# Patient Record
Sex: FEMALE | Race: BLACK OR AFRICAN AMERICAN | NOT HISPANIC OR LATINO
[De-identification: names, ages, dates, MRNs, and addresses within clinical notes are randomized per-mention and may not be internally consistent; named-entity substitution may affect disease eponyms.]

---

## 2019-11-19 ENCOUNTER — APPOINTMENT (OUTPATIENT)
Dept: OBGYN | Facility: CLINIC | Age: 34
End: 2019-11-19

## 2019-11-19 VITALS — BODY MASS INDEX: 44.41 KG/M2 | HEIGHT: 68 IN | WEIGHT: 293 LBS

## 2019-11-19 DIAGNOSIS — Z86.19 PERSONAL HISTORY OF OTHER INFECTIOUS AND PARASITIC DISEASES: ICD-10-CM

## 2019-11-19 DIAGNOSIS — M77.9 ENTHESOPATHY, UNSPECIFIED: ICD-10-CM

## 2019-11-19 DIAGNOSIS — Z78.9 OTHER SPECIFIED HEALTH STATUS: ICD-10-CM

## 2019-11-19 PROBLEM — Z00.00 ENCOUNTER FOR PREVENTIVE HEALTH EXAMINATION: Status: ACTIVE | Noted: 2019-11-19

## 2019-11-20 LAB
ABO + RH PNL BLD: NORMAL
ALBUMIN SERPL ELPH-MCNC: 4.3 G/DL
ALP BLD-CCNC: 44 U/L
ALT SERPL-CCNC: 30 U/L
ANION GAP SERPL CALC-SCNC: 16 MMOL/L
AST SERPL-CCNC: 23 U/L
BASOPHILS # BLD AUTO: 0.03 K/UL
BASOPHILS NFR BLD AUTO: 0.5 %
BILIRUB SERPL-MCNC: 0.2 MG/DL
BLD GP AB SCN SERPL QL: NORMAL
BUN SERPL-MCNC: 8 MG/DL
CALCIUM SERPL-MCNC: 9.3 MG/DL
CHLORIDE SERPL-SCNC: 103 MMOL/L
CO2 SERPL-SCNC: 21 MMOL/L
CREAT SERPL-MCNC: 0.83 MG/DL
EOSINOPHIL # BLD AUTO: 0.05 K/UL
EOSINOPHIL NFR BLD AUTO: 0.8 %
GLUCOSE SERPL-MCNC: 34 MG/DL
HCT VFR BLD CALC: 39.1 %
HGB BLD-MCNC: 12.5 G/DL
HIV1+2 AB SPEC QL IA.RAPID: NONREACTIVE
IMM GRANULOCYTES NFR BLD AUTO: 0.3 %
LYMPHOCYTES # BLD AUTO: 1.82 K/UL
LYMPHOCYTES NFR BLD AUTO: 27.5 %
MAN DIFF?: NORMAL
MCHC RBC-ENTMCNC: 28.4 PG
MCHC RBC-ENTMCNC: 32 GM/DL
MCV RBC AUTO: 88.9 FL
MONOCYTES # BLD AUTO: 0.56 K/UL
MONOCYTES NFR BLD AUTO: 8.5 %
NEUTROPHILS # BLD AUTO: 4.13 K/UL
NEUTROPHILS NFR BLD AUTO: 62.4 %
PLATELET # BLD AUTO: 203 K/UL
POTASSIUM SERPL-SCNC: 4 MMOL/L
PROT SERPL-MCNC: 7.1 G/DL
RBC # BLD: 4.4 M/UL
RBC # FLD: 13.9 %
SODIUM SERPL-SCNC: 140 MMOL/L
TSH SERPL-ACNC: 2.47 UIU/ML
WBC # FLD AUTO: 6.61 K/UL

## 2019-11-22 LAB
B19V IGG SER QL IA: 0.4 INDEX
B19V IGG+IGM SER-IMP: NEGATIVE
B19V IGG+IGM SER-IMP: NORMAL
B19V IGM FLD-ACNC: 0.1
B19V IGM SER-ACNC: NEGATIVE
C TRACH RRNA SPEC QL NAA+PROBE: NOT DETECTED
CANDIDA VAG CYTO: NOT DETECTED
CMV IGG SERPL QL: 8.7 U/ML
CMV IGG SERPL-IMP: POSITIVE
CMV IGM SERPL QL: <8 AU/ML
CMV IGM SERPL QL: NEGATIVE
CYTOMEGALOVIRUS ABS IGM: <30 AU/ML
G VAGINALIS+PREV SP MTYP VAG QL MICRO: DETECTED
HBV CORE IGG+IGM SER QL: REACTIVE
HBV SURFACE AB SERPL IA-ACNC: <3 MIU/ML
HBV SURFACE AG SER QL: REACTIVE
HCV AB SER QL: NONREACTIVE
HCV RNA FLD QL NAA+PROBE: NORMAL
HCV RNA SPEC QL PROBE+SIG AMP: NOT DETECTED
HCV S/CO RATIO: 0.11 S/CO
HERPES SIMPLEX 1 AND 2 ABS IGM: <0.91 RATIO
HGB A MFR BLD: 55.9 %
HGB A2 MFR BLD: 3.4 %
HGB FRACT BLD-IMP: NORMAL
HGB S BLD QL SOLY: POSITIVE
HGB S MFR BLD: 40.7 %
MEV IGG FLD QL IA: >300 AU/ML
MEV IGG+IGM SER-IMP: POSITIVE
MEV IGM SER QL: NEGATIVE
MUV AB SER-ACNC: POSITIVE
MUV IGG SER QL IA: >300 AU/ML
MUV IGM SER QL IA: 1.69 AU
N GONORRHOEA RRNA SPEC QL NAA+PROBE: NOT DETECTED
RUBV IGG FLD-ACNC: 27.8 INDEX
RUBV IGG SER-IMP: POSITIVE
RUBV IGM FLD-ACNC: <20 AU/ML
SOURCE AMPLIFICATION: NORMAL
T GONDII AB SER-IMP: NEGATIVE
T GONDII AB SER-IMP: POSITIVE
T GONDII IGG SER QL: 104 IU/ML
T GONDII IGM SER QL: <3 AU/ML
T PALLIDUM AB SER QL IA: NEGATIVE
T VAGINALIS VAG QL WET PREP: NOT DETECTED
TOXOPLASMA GONDII ABS IGM: <3 AU/ML
VZV AB TITR SER: POSITIVE
VZV IGG SER IF-ACNC: 2952 INDEX
VZV IGM SER IF-ACNC: <0.91 INDEX

## 2019-11-26 ENCOUNTER — APPOINTMENT (OUTPATIENT)
Dept: ANTEPARTUM | Facility: CLINIC | Age: 34
End: 2019-11-26
Payer: COMMERCIAL

## 2019-11-26 PROCEDURE — 76801 OB US < 14 WKS SINGLE FETUS: CPT

## 2019-12-04 RX ORDER — CHLORHEXIDINE GLUCONATE 4 %
400 LIQUID (ML) TOPICAL DAILY
Qty: 30 | Refills: 11 | Status: ACTIVE | COMMUNITY
Start: 2019-12-04 | End: 1900-01-01

## 2019-12-05 ENCOUNTER — APPOINTMENT (OUTPATIENT)
Dept: ANTEPARTUM | Facility: CLINIC | Age: 34
End: 2019-12-05

## 2019-12-05 ENCOUNTER — APPOINTMENT (OUTPATIENT)
Dept: MATERNAL FETAL MEDICINE | Facility: CLINIC | Age: 34
End: 2019-12-05
Payer: COMMERCIAL

## 2019-12-17 ENCOUNTER — APPOINTMENT (OUTPATIENT)
Dept: MATERNAL FETAL MEDICINE | Facility: CLINIC | Age: 34
End: 2019-12-17
Payer: COMMERCIAL

## 2019-12-17 ENCOUNTER — APPOINTMENT (OUTPATIENT)
Dept: ANTEPARTUM | Facility: CLINIC | Age: 34
End: 2019-12-17

## 2019-12-17 PROCEDURE — 76801 OB US < 14 WKS SINGLE FETUS: CPT

## 2019-12-17 PROCEDURE — 99205 OFFICE O/P NEW HI 60 MIN: CPT

## 2019-12-17 PROCEDURE — 76813 OB US NUCHAL MEAS 1 GEST: CPT

## 2019-12-18 ENCOUNTER — NON-APPOINTMENT (OUTPATIENT)
Age: 34
End: 2019-12-18

## 2019-12-18 ENCOUNTER — APPOINTMENT (OUTPATIENT)
Dept: OBGYN | Facility: CLINIC | Age: 34
End: 2019-12-18
Payer: COMMERCIAL

## 2019-12-18 VITALS
HEIGHT: 68 IN | SYSTOLIC BLOOD PRESSURE: 115 MMHG | WEIGHT: 293 LBS | DIASTOLIC BLOOD PRESSURE: 70 MMHG | BODY MASS INDEX: 44.41 KG/M2

## 2019-12-18 PROCEDURE — 0502F SUBSEQUENT PRENATAL CARE: CPT

## 2019-12-18 PROCEDURE — 36415 COLL VENOUS BLD VENIPUNCTURE: CPT

## 2019-12-23 LAB
MUV AB SER-ACNC: POSITIVE
MUV IGG SER QL IA: >300 AU/ML
MUV IGM SER QL IA: 1.01 AU

## 2020-01-15 ENCOUNTER — APPOINTMENT (OUTPATIENT)
Dept: OBGYN | Facility: CLINIC | Age: 35
End: 2020-01-15
Payer: COMMERCIAL

## 2020-01-15 ENCOUNTER — LABORATORY RESULT (OUTPATIENT)
Age: 35
End: 2020-01-15

## 2020-01-15 VITALS
DIASTOLIC BLOOD PRESSURE: 80 MMHG | BODY MASS INDEX: 44.41 KG/M2 | HEIGHT: 68 IN | SYSTOLIC BLOOD PRESSURE: 125 MMHG | WEIGHT: 293 LBS

## 2020-01-15 DIAGNOSIS — Z34.90 ENCOUNTER FOR SUPERVISION OF NORMAL PREGNANCY, UNSPECIFIED, UNSPECIFIED TRIMESTER: ICD-10-CM

## 2020-01-15 PROCEDURE — 0502F SUBSEQUENT PRENATAL CARE: CPT

## 2020-01-15 PROCEDURE — 36415 COLL VENOUS BLD VENIPUNCTURE: CPT

## 2020-01-15 RX ORDER — DICLOFENAC SODIUM 3 G/100G
3 GEL TOPICAL
Qty: 100 | Refills: 0 | Status: ACTIVE | COMMUNITY
Start: 2020-01-09

## 2020-01-28 ENCOUNTER — APPOINTMENT (OUTPATIENT)
Dept: ANTEPARTUM | Facility: CLINIC | Age: 35
End: 2020-01-28
Payer: COMMERCIAL

## 2020-01-28 PROCEDURE — 76811 OB US DETAILED SNGL FETUS: CPT

## 2020-01-28 PROCEDURE — 76817 TRANSVAGINAL US OBSTETRIC: CPT

## 2020-01-29 ENCOUNTER — APPOINTMENT (OUTPATIENT)
Dept: ANTEPARTUM | Facility: CLINIC | Age: 35
End: 2020-01-29

## 2020-01-29 LAB
ESTIMATED AVERAGE GLUCOSE: 94 MG/DL
HBA1C MFR BLD HPLC: 4.9 %

## 2020-02-04 ENCOUNTER — APPOINTMENT (OUTPATIENT)
Dept: ANTEPARTUM | Facility: CLINIC | Age: 35
End: 2020-02-04

## 2020-02-07 ENCOUNTER — APPOINTMENT (OUTPATIENT)
Dept: ANTEPARTUM | Facility: CLINIC | Age: 35
End: 2020-02-07
Payer: COMMERCIAL

## 2020-02-07 PROCEDURE — 76816 OB US FOLLOW-UP PER FETUS: CPT

## 2020-02-12 ENCOUNTER — APPOINTMENT (OUTPATIENT)
Dept: OBGYN | Facility: CLINIC | Age: 35
End: 2020-02-12
Payer: COMMERCIAL

## 2020-02-12 VITALS
HEIGHT: 68 IN | SYSTOLIC BLOOD PRESSURE: 118 MMHG | BODY MASS INDEX: 44.41 KG/M2 | DIASTOLIC BLOOD PRESSURE: 70 MMHG | WEIGHT: 293 LBS

## 2020-02-12 PROCEDURE — 0502F SUBSEQUENT PRENATAL CARE: CPT

## 2020-02-21 ENCOUNTER — APPOINTMENT (OUTPATIENT)
Dept: ANTEPARTUM | Facility: CLINIC | Age: 35
End: 2020-02-21
Payer: COMMERCIAL

## 2020-02-21 PROCEDURE — 76817 TRANSVAGINAL US OBSTETRIC: CPT

## 2020-02-21 PROCEDURE — 76816 OB US FOLLOW-UP PER FETUS: CPT

## 2020-03-11 ENCOUNTER — NON-APPOINTMENT (OUTPATIENT)
Age: 35
End: 2020-03-11

## 2020-03-11 ENCOUNTER — APPOINTMENT (OUTPATIENT)
Dept: OBGYN | Facility: CLINIC | Age: 35
End: 2020-03-11
Payer: COMMERCIAL

## 2020-03-11 VITALS
BODY MASS INDEX: 44.41 KG/M2 | DIASTOLIC BLOOD PRESSURE: 70 MMHG | WEIGHT: 293 LBS | HEIGHT: 68 IN | SYSTOLIC BLOOD PRESSURE: 120 MMHG

## 2020-03-11 PROCEDURE — 36415 COLL VENOUS BLD VENIPUNCTURE: CPT

## 2020-03-11 PROCEDURE — 0502F SUBSEQUENT PRENATAL CARE: CPT

## 2020-03-17 ENCOUNTER — APPOINTMENT (OUTPATIENT)
Dept: ANTEPARTUM | Facility: CLINIC | Age: 35
End: 2020-03-17
Payer: COMMERCIAL

## 2020-03-17 PROCEDURE — 76816 OB US FOLLOW-UP PER FETUS: CPT

## 2020-03-17 PROCEDURE — 76819 FETAL BIOPHYS PROFIL W/O NST: CPT

## 2020-04-03 ENCOUNTER — APPOINTMENT (OUTPATIENT)
Dept: OBGYN | Facility: CLINIC | Age: 35
End: 2020-04-03
Payer: COMMERCIAL

## 2020-04-03 ENCOUNTER — NON-APPOINTMENT (OUTPATIENT)
Age: 35
End: 2020-04-03

## 2020-04-03 VITALS
BODY MASS INDEX: 44.41 KG/M2 | DIASTOLIC BLOOD PRESSURE: 80 MMHG | HEIGHT: 68 IN | SYSTOLIC BLOOD PRESSURE: 120 MMHG | WEIGHT: 293 LBS

## 2020-04-03 PROCEDURE — 0502F SUBSEQUENT PRENATAL CARE: CPT

## 2020-04-03 PROCEDURE — 36415 COLL VENOUS BLD VENIPUNCTURE: CPT

## 2020-04-07 LAB
BACTERIA UR CULT: NORMAL
BASOPHILS # BLD AUTO: 0.03 K/UL
BASOPHILS NFR BLD AUTO: 0.3 %
EOSINOPHIL # BLD AUTO: 0.04 K/UL
EOSINOPHIL NFR BLD AUTO: 0.4 %
ESTIMATED AVERAGE GLUCOSE: 91 MG/DL
GLUCOSE 1H P 50 G GLC PO SERPL-MCNC: 81 MG/DL
HBA1C MFR BLD HPLC: 4.8 %
HCT VFR BLD CALC: 32.4 %
HGB BLD-MCNC: 10.3 G/DL
IMM GRANULOCYTES NFR BLD AUTO: 1.3 %
LYMPHOCYTES # BLD AUTO: 1.43 K/UL
LYMPHOCYTES NFR BLD AUTO: 15 %
MAN DIFF?: NORMAL
MCHC RBC-ENTMCNC: 27.6 PG
MCHC RBC-ENTMCNC: 31.8 GM/DL
MCV RBC AUTO: 86.9 FL
MONOCYTES # BLD AUTO: 0.63 K/UL
MONOCYTES NFR BLD AUTO: 6.6 %
NEUTROPHILS # BLD AUTO: 7.29 K/UL
NEUTROPHILS NFR BLD AUTO: 76.4 %
PLATELET # BLD AUTO: 147 K/UL
RBC # BLD: 3.73 M/UL
RBC # FLD: 14.6 %
WBC # FLD AUTO: 9.54 K/UL

## 2020-04-28 ENCOUNTER — NON-APPOINTMENT (OUTPATIENT)
Age: 35
End: 2020-04-28

## 2020-04-28 ENCOUNTER — APPOINTMENT (OUTPATIENT)
Dept: OBGYN | Facility: CLINIC | Age: 35
End: 2020-04-28
Payer: COMMERCIAL

## 2020-04-28 ENCOUNTER — APPOINTMENT (OUTPATIENT)
Dept: ANTEPARTUM | Facility: CLINIC | Age: 35
End: 2020-04-28
Payer: COMMERCIAL

## 2020-04-28 VITALS
SYSTOLIC BLOOD PRESSURE: 121 MMHG | HEIGHT: 68 IN | BODY MASS INDEX: 44.41 KG/M2 | DIASTOLIC BLOOD PRESSURE: 75 MMHG | WEIGHT: 293 LBS

## 2020-04-28 PROCEDURE — 76819 FETAL BIOPHYS PROFIL W/O NST: CPT

## 2020-04-28 PROCEDURE — 0502F SUBSEQUENT PRENATAL CARE: CPT

## 2020-04-28 PROCEDURE — 76816 OB US FOLLOW-UP PER FETUS: CPT

## 2020-05-19 ENCOUNTER — APPOINTMENT (OUTPATIENT)
Dept: ANTEPARTUM | Facility: CLINIC | Age: 35
End: 2020-05-19
Payer: COMMERCIAL

## 2020-05-19 ENCOUNTER — APPOINTMENT (OUTPATIENT)
Dept: OBGYN | Facility: CLINIC | Age: 35
End: 2020-05-19
Payer: COMMERCIAL

## 2020-05-19 VITALS
BODY MASS INDEX: 44.41 KG/M2 | WEIGHT: 293 LBS | HEIGHT: 68 IN | DIASTOLIC BLOOD PRESSURE: 70 MMHG | SYSTOLIC BLOOD PRESSURE: 121 MMHG

## 2020-05-19 PROCEDURE — 0502F SUBSEQUENT PRENATAL CARE: CPT

## 2020-05-19 PROCEDURE — 76819 FETAL BIOPHYS PROFIL W/O NST: CPT

## 2020-05-19 PROCEDURE — 76816 OB US FOLLOW-UP PER FETUS: CPT

## 2020-05-28 RX ORDER — NEEDLES, DISPOSABLE 25GX5/8"
27G X 1/2" NEEDLE, DISPOSABLE MISCELLANEOUS
Qty: 1 | Refills: 4 | Status: ACTIVE | COMMUNITY
Start: 2020-05-28 | End: 1900-01-01

## 2020-06-02 ENCOUNTER — APPOINTMENT (OUTPATIENT)
Dept: OBGYN | Facility: CLINIC | Age: 35
End: 2020-06-02
Payer: COMMERCIAL

## 2020-06-02 ENCOUNTER — NON-APPOINTMENT (OUTPATIENT)
Age: 35
End: 2020-06-02

## 2020-06-02 ENCOUNTER — APPOINTMENT (OUTPATIENT)
Dept: ANTEPARTUM | Facility: CLINIC | Age: 35
End: 2020-06-02
Payer: COMMERCIAL

## 2020-06-02 VITALS
SYSTOLIC BLOOD PRESSURE: 117 MMHG | HEIGHT: 68 IN | DIASTOLIC BLOOD PRESSURE: 70 MMHG | WEIGHT: 293 LBS | BODY MASS INDEX: 44.41 KG/M2

## 2020-06-02 PROCEDURE — 0502F SUBSEQUENT PRENATAL CARE: CPT

## 2020-06-02 PROCEDURE — 76819 FETAL BIOPHYS PROFIL W/O NST: CPT

## 2020-06-02 PROCEDURE — 76816 OB US FOLLOW-UP PER FETUS: CPT

## 2020-06-09 ENCOUNTER — APPOINTMENT (OUTPATIENT)
Dept: ANTEPARTUM | Facility: CLINIC | Age: 35
End: 2020-06-09
Payer: COMMERCIAL

## 2020-06-09 ENCOUNTER — APPOINTMENT (OUTPATIENT)
Dept: OBGYN | Facility: CLINIC | Age: 35
End: 2020-06-09
Payer: COMMERCIAL

## 2020-06-09 VITALS
SYSTOLIC BLOOD PRESSURE: 118 MMHG | HEIGHT: 68 IN | BODY MASS INDEX: 44.41 KG/M2 | DIASTOLIC BLOOD PRESSURE: 72 MMHG | WEIGHT: 293 LBS

## 2020-06-09 PROCEDURE — 0502F SUBSEQUENT PRENATAL CARE: CPT

## 2020-06-09 PROCEDURE — 76819 FETAL BIOPHYS PROFIL W/O NST: CPT

## 2020-06-09 PROCEDURE — 76816 OB US FOLLOW-UP PER FETUS: CPT

## 2020-06-16 ENCOUNTER — APPOINTMENT (OUTPATIENT)
Dept: OBGYN | Facility: CLINIC | Age: 35
End: 2020-06-16
Payer: COMMERCIAL

## 2020-06-16 ENCOUNTER — APPOINTMENT (OUTPATIENT)
Dept: ANTEPARTUM | Facility: CLINIC | Age: 35
End: 2020-06-16
Payer: COMMERCIAL

## 2020-06-16 VITALS
HEIGHT: 68 IN | DIASTOLIC BLOOD PRESSURE: 75 MMHG | BODY MASS INDEX: 44.41 KG/M2 | WEIGHT: 293 LBS | SYSTOLIC BLOOD PRESSURE: 121 MMHG

## 2020-06-16 PROCEDURE — 76816 OB US FOLLOW-UP PER FETUS: CPT

## 2020-06-16 PROCEDURE — 0502F SUBSEQUENT PRENATAL CARE: CPT

## 2020-06-16 PROCEDURE — 76818 FETAL BIOPHYS PROFILE W/NST: CPT

## 2020-06-20 ENCOUNTER — TRANSCRIPTION ENCOUNTER (OUTPATIENT)
Age: 35
End: 2020-06-20

## 2020-06-20 ENCOUNTER — INPATIENT (INPATIENT)
Facility: HOSPITAL | Age: 35
LOS: 4 days | Discharge: HOME | End: 2020-06-25
Attending: OBSTETRICS & GYNECOLOGY | Admitting: OBSTETRICS & GYNECOLOGY
Payer: COMMERCIAL

## 2020-06-20 VITALS — HEIGHT: 68 IN | WEIGHT: 293 LBS

## 2020-06-20 DIAGNOSIS — Z3A.00 WEEKS OF GESTATION OF PREGNANCY NOT SPECIFIED: ICD-10-CM

## 2020-06-20 DIAGNOSIS — O26.899 OTHER SPECIFIED PREGNANCY RELATED CONDITIONS, UNSPECIFIED TRIMESTER: ICD-10-CM

## 2020-06-20 LAB
ALBUMIN SERPL ELPH-MCNC: 3.3 G/DL — SIGNIFICANT CHANGE UP (ref 3.3–5)
ALP SERPL-CCNC: 286 U/L — HIGH (ref 40–120)
ALT FLD-CCNC: 29 U/L — SIGNIFICANT CHANGE UP (ref 10–45)
AMNISURE ROM (RUPTURE OF MEMBRANES): POSITIVE
ANION GAP SERPL CALC-SCNC: 10 MMOL/L — SIGNIFICANT CHANGE UP (ref 5–17)
APPEARANCE UR: CLEAR — SIGNIFICANT CHANGE UP
APTT BLD: 26.8 SEC — LOW (ref 27.5–36.3)
AST SERPL-CCNC: 76 U/L — HIGH (ref 10–40)
BASOPHILS # BLD AUTO: 0.01 K/UL — SIGNIFICANT CHANGE UP (ref 0–0.2)
BASOPHILS NFR BLD AUTO: 0.2 % — SIGNIFICANT CHANGE UP (ref 0–2)
BILIRUB SERPL-MCNC: 0.3 MG/DL — SIGNIFICANT CHANGE UP (ref 0.2–1.2)
BILIRUB UR-MCNC: NEGATIVE — SIGNIFICANT CHANGE UP
BUN SERPL-MCNC: 8 MG/DL — SIGNIFICANT CHANGE UP (ref 7–23)
CALCIUM SERPL-MCNC: 8.9 MG/DL — SIGNIFICANT CHANGE UP (ref 8.4–10.5)
CHLORIDE SERPL-SCNC: 108 MMOL/L — SIGNIFICANT CHANGE UP (ref 96–108)
CO2 SERPL-SCNC: 20 MMOL/L — LOW (ref 22–31)
COLOR SPEC: YELLOW — SIGNIFICANT CHANGE UP
CREAT ?TM UR-MCNC: 129 MG/DL — SIGNIFICANT CHANGE UP
CREAT SERPL-MCNC: 0.96 MG/DL — SIGNIFICANT CHANGE UP (ref 0.5–1.3)
DIFF PNL FLD: ABNORMAL
EOSINOPHIL # BLD AUTO: 0.05 K/UL — SIGNIFICANT CHANGE UP (ref 0–0.5)
EOSINOPHIL NFR BLD AUTO: 0.8 % — SIGNIFICANT CHANGE UP (ref 0–6)
FIBRINOGEN PPP-MCNC: 368 MG/DL — SIGNIFICANT CHANGE UP (ref 258–438)
GLUCOSE SERPL-MCNC: 100 MG/DL — HIGH (ref 70–99)
GLUCOSE UR QL: NEGATIVE — SIGNIFICANT CHANGE UP
GP B STREP DNA SPEC QL NAA+PROBE: DETECTED
GP B STREP DNA SPEC QL NAA+PROBE: NORMAL
HCT VFR BLD CALC: 31.3 % — LOW (ref 34.5–45)
HGB BLD-MCNC: 10.4 G/DL — LOW (ref 11.5–15.5)
IMM GRANULOCYTES NFR BLD AUTO: 0.8 % — SIGNIFICANT CHANGE UP (ref 0–1.5)
INR BLD: 0.96 — SIGNIFICANT CHANGE UP (ref 0.88–1.16)
KETONES UR-MCNC: NEGATIVE — SIGNIFICANT CHANGE UP
LDH SERPL L TO P-CCNC: 242 U/L — SIGNIFICANT CHANGE UP (ref 50–242)
LEUKOCYTE ESTERASE UR-ACNC: NEGATIVE — SIGNIFICANT CHANGE UP
LYMPHOCYTES # BLD AUTO: 1.52 K/UL — SIGNIFICANT CHANGE UP (ref 1–3.3)
LYMPHOCYTES # BLD AUTO: 23.5 % — SIGNIFICANT CHANGE UP (ref 13–44)
MCHC RBC-ENTMCNC: 25.9 PG — LOW (ref 27–34)
MCHC RBC-ENTMCNC: 33.2 GM/DL — SIGNIFICANT CHANGE UP (ref 32–36)
MCV RBC AUTO: 77.9 FL — LOW (ref 80–100)
MONOCYTES # BLD AUTO: 0.41 K/UL — SIGNIFICANT CHANGE UP (ref 0–0.9)
MONOCYTES NFR BLD AUTO: 6.3 % — SIGNIFICANT CHANGE UP (ref 2–14)
NEUTROPHILS # BLD AUTO: 4.43 K/UL — SIGNIFICANT CHANGE UP (ref 1.8–7.4)
NEUTROPHILS NFR BLD AUTO: 68.4 % — SIGNIFICANT CHANGE UP (ref 43–77)
NITRITE UR-MCNC: NEGATIVE — SIGNIFICANT CHANGE UP
NRBC # BLD: 0 /100 WBCS — SIGNIFICANT CHANGE UP (ref 0–0)
PH UR: 6 — SIGNIFICANT CHANGE UP (ref 5–8)
PLATELET # BLD AUTO: 157 K/UL — SIGNIFICANT CHANGE UP (ref 150–400)
POTASSIUM SERPL-MCNC: 3.8 MMOL/L — SIGNIFICANT CHANGE UP (ref 3.5–5.3)
POTASSIUM SERPL-SCNC: 3.8 MMOL/L — SIGNIFICANT CHANGE UP (ref 3.5–5.3)
PROT ?TM UR-MCNC: 11 MG/DL — SIGNIFICANT CHANGE UP (ref 0–12)
PROT SERPL-MCNC: 6 G/DL — SIGNIFICANT CHANGE UP (ref 6–8.3)
PROT UR-MCNC: NEGATIVE MG/DL — SIGNIFICANT CHANGE UP
PROT/CREAT UR-RTO: 0.1 RATIO — SIGNIFICANT CHANGE UP (ref 0–0.2)
PROTHROM AB SERPL-ACNC: 10.9 SEC — SIGNIFICANT CHANGE UP (ref 10–12.9)
RBC # BLD: 4.02 M/UL — SIGNIFICANT CHANGE UP (ref 3.8–5.2)
RBC # FLD: 16.3 % — HIGH (ref 10.3–14.5)
SARS-COV-2 RNA SPEC QL NAA+PROBE: SIGNIFICANT CHANGE UP
SODIUM SERPL-SCNC: 138 MMOL/L — SIGNIFICANT CHANGE UP (ref 135–145)
SOURCE GBS: NORMAL
SP GR SPEC: 1.01 — SIGNIFICANT CHANGE UP (ref 1–1.03)
URATE SERPL-MCNC: 5.3 MG/DL — SIGNIFICANT CHANGE UP (ref 2.5–7)
UROBILINOGEN FLD QL: 0.2 E.U./DL — SIGNIFICANT CHANGE UP
WBC # BLD: 6.47 K/UL — SIGNIFICANT CHANGE UP (ref 3.8–10.5)
WBC # FLD AUTO: 6.47 K/UL — SIGNIFICANT CHANGE UP (ref 3.8–10.5)

## 2020-06-20 PROCEDURE — 59510 CESAREAN DELIVERY: CPT

## 2020-06-20 RX ORDER — AMPICILLIN TRIHYDRATE 250 MG
1 CAPSULE ORAL EVERY 4 HOURS
Refills: 0 | Status: DISCONTINUED | OUTPATIENT
Start: 2020-06-20 | End: 2020-06-21

## 2020-06-20 RX ORDER — OXYTOCIN 10 UNIT/ML
1 VIAL (ML) INJECTION
Qty: 30 | Refills: 0 | Status: DISCONTINUED | OUTPATIENT
Start: 2020-06-20 | End: 2020-06-21

## 2020-06-20 RX ORDER — OXYTOCIN 10 UNIT/ML
333.33 VIAL (ML) INJECTION
Qty: 20 | Refills: 0 | Status: DISCONTINUED | OUTPATIENT
Start: 2020-06-20 | End: 2020-06-21

## 2020-06-20 RX ORDER — SODIUM CHLORIDE 9 MG/ML
1000 INJECTION, SOLUTION INTRAVENOUS
Refills: 0 | Status: DISCONTINUED | OUTPATIENT
Start: 2020-06-20 | End: 2020-06-21

## 2020-06-20 RX ORDER — CITRIC ACID/SODIUM CITRATE 300-500 MG
15 SOLUTION, ORAL ORAL ONCE
Refills: 0 | Status: DISCONTINUED | OUTPATIENT
Start: 2020-06-20 | End: 2020-06-21

## 2020-06-20 RX ORDER — AMPICILLIN TRIHYDRATE 250 MG
2 CAPSULE ORAL ONCE
Refills: 0 | Status: COMPLETED | OUTPATIENT
Start: 2020-06-20 | End: 2020-06-20

## 2020-06-20 RX ORDER — AMPICILLIN TRIHYDRATE 250 MG
CAPSULE ORAL
Refills: 0 | Status: DISCONTINUED | OUTPATIENT
Start: 2020-06-20 | End: 2020-06-21

## 2020-06-20 RX ADMIN — Medication 216 GRAM(S): at 12:45

## 2020-06-20 RX ADMIN — Medication 208 GRAM(S): at 20:49

## 2020-06-20 RX ADMIN — SODIUM CHLORIDE 125 MILLILITER(S): 9 INJECTION, SOLUTION INTRAVENOUS at 11:51

## 2020-06-20 RX ADMIN — Medication 208 GRAM(S): at 16:40

## 2020-06-21 ENCOUNTER — RESULT REVIEW (OUTPATIENT)
Age: 35
End: 2020-06-21

## 2020-06-21 LAB
ALBUMIN SERPL ELPH-MCNC: 2.7 G/DL — LOW (ref 3.3–5)
ALP SERPL-CCNC: 252 U/L — HIGH (ref 40–120)
ALT FLD-CCNC: 25 U/L — SIGNIFICANT CHANGE UP (ref 10–45)
ANION GAP SERPL CALC-SCNC: 11 MMOL/L — SIGNIFICANT CHANGE UP (ref 5–17)
APTT BLD: 32.2 SEC — SIGNIFICANT CHANGE UP (ref 27.5–36.3)
AST SERPL-CCNC: 50 U/L — HIGH (ref 10–40)
BASOPHILS # BLD AUTO: 0 K/UL — SIGNIFICANT CHANGE UP (ref 0–0.2)
BASOPHILS NFR BLD AUTO: 0 % — SIGNIFICANT CHANGE UP (ref 0–2)
BILIRUB SERPL-MCNC: 1.1 MG/DL — SIGNIFICANT CHANGE UP (ref 0.2–1.2)
BUN SERPL-MCNC: 10 MG/DL — SIGNIFICANT CHANGE UP (ref 7–23)
CALCIUM SERPL-MCNC: 8.3 MG/DL — LOW (ref 8.4–10.5)
CHLORIDE SERPL-SCNC: 106 MMOL/L — SIGNIFICANT CHANGE UP (ref 96–108)
CO2 SERPL-SCNC: 20 MMOL/L — LOW (ref 22–31)
CREAT SERPL-MCNC: 1.42 MG/DL — HIGH (ref 0.5–1.3)
EOSINOPHIL # BLD AUTO: 0 K/UL — SIGNIFICANT CHANGE UP (ref 0–0.5)
EOSINOPHIL NFR BLD AUTO: 0 % — SIGNIFICANT CHANGE UP (ref 0–6)
FIBRINOGEN PPP-MCNC: 371 MG/DL — SIGNIFICANT CHANGE UP (ref 258–438)
GLUCOSE SERPL-MCNC: 111 MG/DL — HIGH (ref 70–99)
HCT VFR BLD CALC: 33.4 % — LOW (ref 34.5–45)
HGB BLD-MCNC: 10.9 G/DL — LOW (ref 11.5–15.5)
INR BLD: 1.05 — SIGNIFICANT CHANGE UP (ref 0.88–1.16)
LDH SERPL L TO P-CCNC: 276 U/L — HIGH (ref 50–242)
LYMPHOCYTES # BLD AUTO: 0.33 K/UL — LOW (ref 1–3.3)
LYMPHOCYTES # BLD AUTO: 1.7 % — LOW (ref 13–44)
MCHC RBC-ENTMCNC: 26.3 PG — LOW (ref 27–34)
MCHC RBC-ENTMCNC: 32.6 GM/DL — SIGNIFICANT CHANGE UP (ref 32–36)
MCV RBC AUTO: 80.5 FL — SIGNIFICANT CHANGE UP (ref 80–100)
MONOCYTES # BLD AUTO: 0.51 K/UL — SIGNIFICANT CHANGE UP (ref 0–0.9)
MONOCYTES NFR BLD AUTO: 2.6 % — SIGNIFICANT CHANGE UP (ref 2–14)
NEUTROPHILS # BLD AUTO: 18.48 K/UL — HIGH (ref 1.8–7.4)
NEUTROPHILS NFR BLD AUTO: 91.3 % — HIGH (ref 43–77)
PLATELET # BLD AUTO: 125 K/UL — LOW (ref 150–400)
POTASSIUM SERPL-MCNC: 4.7 MMOL/L — SIGNIFICANT CHANGE UP (ref 3.5–5.3)
POTASSIUM SERPL-SCNC: 4.7 MMOL/L — SIGNIFICANT CHANGE UP (ref 3.5–5.3)
PROT SERPL-MCNC: 5.4 G/DL — LOW (ref 6–8.3)
PROTHROM AB SERPL-ACNC: 12 SEC — SIGNIFICANT CHANGE UP (ref 10–12.9)
RBC # BLD: 4.15 M/UL — SIGNIFICANT CHANGE UP (ref 3.8–5.2)
RBC # FLD: 16.5 % — HIGH (ref 10.3–14.5)
SODIUM SERPL-SCNC: 137 MMOL/L — SIGNIFICANT CHANGE UP (ref 135–145)
T PALLIDUM AB TITR SER: NEGATIVE — SIGNIFICANT CHANGE UP
URATE SERPL-MCNC: 5 MG/DL — SIGNIFICANT CHANGE UP (ref 2.5–7)
WBC # BLD: 19.49 K/UL — HIGH (ref 3.8–10.5)
WBC # FLD AUTO: 19.49 K/UL — HIGH (ref 3.8–10.5)

## 2020-06-21 PROCEDURE — 88307 TISSUE EXAM BY PATHOLOGIST: CPT | Mod: 26

## 2020-06-21 RX ORDER — MAGNESIUM SULFATE 500 MG/ML
4 VIAL (ML) INJECTION ONCE
Refills: 0 | Status: COMPLETED | OUTPATIENT
Start: 2020-06-21 | End: 2020-06-21

## 2020-06-21 RX ORDER — OXYCODONE HYDROCHLORIDE 5 MG/1
5 TABLET ORAL ONCE
Refills: 0 | Status: DISCONTINUED | OUTPATIENT
Start: 2020-06-21 | End: 2020-06-25

## 2020-06-21 RX ORDER — ONDANSETRON 8 MG/1
4 TABLET, FILM COATED ORAL EVERY 6 HOURS
Refills: 0 | Status: DISCONTINUED | OUTPATIENT
Start: 2020-06-21 | End: 2020-06-25

## 2020-06-21 RX ORDER — ENOXAPARIN SODIUM 100 MG/ML
40 INJECTION SUBCUTANEOUS EVERY 12 HOURS
Refills: 0 | Status: DISCONTINUED | OUTPATIENT
Start: 2020-06-21 | End: 2020-06-25

## 2020-06-21 RX ORDER — GENTAMICIN SULFATE 40 MG/ML
320 VIAL (ML) INJECTION ONCE
Refills: 0 | Status: COMPLETED | OUTPATIENT
Start: 2020-06-21 | End: 2020-06-21

## 2020-06-21 RX ORDER — SODIUM CHLORIDE 9 MG/ML
1000 INJECTION, SOLUTION INTRAVENOUS
Refills: 0 | Status: DISCONTINUED | OUTPATIENT
Start: 2020-06-21 | End: 2020-06-21

## 2020-06-21 RX ORDER — AMPICILLIN TRIHYDRATE 250 MG
CAPSULE ORAL
Refills: 0 | Status: DISCONTINUED | OUTPATIENT
Start: 2020-06-21 | End: 2020-06-22

## 2020-06-21 RX ORDER — ACETAMINOPHEN 500 MG
1000 TABLET ORAL ONCE
Refills: 0 | Status: DISCONTINUED | OUTPATIENT
Start: 2020-06-21 | End: 2020-06-21

## 2020-06-21 RX ORDER — DIPHENHYDRAMINE HCL 50 MG
25 CAPSULE ORAL EVERY 6 HOURS
Refills: 0 | Status: DISCONTINUED | OUTPATIENT
Start: 2020-06-21 | End: 2020-06-25

## 2020-06-21 RX ORDER — TETANUS TOXOID, REDUCED DIPHTHERIA TOXOID AND ACELLULAR PERTUSSIS VACCINE, ADSORBED 5; 2.5; 8; 8; 2.5 [IU]/.5ML; [IU]/.5ML; UG/.5ML; UG/.5ML; UG/.5ML
0.5 SUSPENSION INTRAMUSCULAR ONCE
Refills: 0 | Status: DISCONTINUED | OUTPATIENT
Start: 2020-06-21 | End: 2020-06-25

## 2020-06-21 RX ORDER — OXYCODONE HYDROCHLORIDE 5 MG/1
5 TABLET ORAL
Refills: 0 | Status: DISCONTINUED | OUTPATIENT
Start: 2020-06-21 | End: 2020-06-25

## 2020-06-21 RX ORDER — IBUPROFEN 200 MG
600 TABLET ORAL EVERY 6 HOURS
Refills: 0 | Status: COMPLETED | OUTPATIENT
Start: 2020-06-21 | End: 2021-05-20

## 2020-06-21 RX ORDER — SIMETHICONE 80 MG/1
80 TABLET, CHEWABLE ORAL EVERY 4 HOURS
Refills: 0 | Status: DISCONTINUED | OUTPATIENT
Start: 2020-06-21 | End: 2020-06-25

## 2020-06-21 RX ORDER — FENTANYL/BUPIVACAINE/NS/PF 2MCG/ML-.1
250 PLASTIC BAG, INJECTION (ML) INJECTION
Refills: 0 | Status: DISCONTINUED | OUTPATIENT
Start: 2020-06-21 | End: 2020-06-21

## 2020-06-21 RX ORDER — CITRIC ACID/SODIUM CITRATE 300-500 MG
30 SOLUTION, ORAL ORAL ONCE
Refills: 0 | Status: COMPLETED | OUTPATIENT
Start: 2020-06-21 | End: 2020-06-21

## 2020-06-21 RX ORDER — MAGNESIUM HYDROXIDE 400 MG/1
30 TABLET, CHEWABLE ORAL
Refills: 0 | Status: DISCONTINUED | OUTPATIENT
Start: 2020-06-21 | End: 2020-06-25

## 2020-06-21 RX ORDER — MORPHINE SULFATE 50 MG/1
3 CAPSULE, EXTENDED RELEASE ORAL ONCE
Refills: 0 | Status: DISCONTINUED | OUTPATIENT
Start: 2020-06-21 | End: 2020-06-25

## 2020-06-21 RX ORDER — SODIUM CHLORIDE 9 MG/ML
500 INJECTION, SOLUTION INTRAVENOUS ONCE
Refills: 0 | Status: COMPLETED | OUTPATIENT
Start: 2020-06-21 | End: 2020-06-21

## 2020-06-21 RX ORDER — FAMOTIDINE 10 MG/ML
20 INJECTION INTRAVENOUS ONCE
Refills: 0 | Status: COMPLETED | OUTPATIENT
Start: 2020-06-21 | End: 2020-06-21

## 2020-06-21 RX ORDER — KETOROLAC TROMETHAMINE 30 MG/ML
30 SYRINGE (ML) INJECTION EVERY 6 HOURS
Refills: 0 | Status: DISCONTINUED | OUTPATIENT
Start: 2020-06-21 | End: 2020-06-22

## 2020-06-21 RX ORDER — SODIUM CHLORIDE 9 MG/ML
1000 INJECTION, SOLUTION INTRAVENOUS ONCE
Refills: 0 | Status: DISCONTINUED | OUTPATIENT
Start: 2020-06-21 | End: 2020-06-21

## 2020-06-21 RX ORDER — MAGNESIUM SULFATE 500 MG/ML
1 VIAL (ML) INJECTION
Qty: 40 | Refills: 0 | Status: DISCONTINUED | OUTPATIENT
Start: 2020-06-21 | End: 2020-06-22

## 2020-06-21 RX ORDER — AMPICILLIN TRIHYDRATE 250 MG
2 CAPSULE ORAL ONCE
Refills: 0 | Status: COMPLETED | OUTPATIENT
Start: 2020-06-21 | End: 2020-06-21

## 2020-06-21 RX ORDER — OXYTOCIN 10 UNIT/ML
333.33 VIAL (ML) INJECTION
Qty: 20 | Refills: 0 | Status: DISCONTINUED | OUTPATIENT
Start: 2020-06-21 | End: 2020-06-25

## 2020-06-21 RX ORDER — SODIUM CHLORIDE 9 MG/ML
1000 INJECTION, SOLUTION INTRAVENOUS
Refills: 0 | Status: DISCONTINUED | OUTPATIENT
Start: 2020-06-21 | End: 2020-06-25

## 2020-06-21 RX ORDER — NALOXONE HYDROCHLORIDE 4 MG/.1ML
0.1 SPRAY NASAL
Refills: 0 | Status: DISCONTINUED | OUTPATIENT
Start: 2020-06-21 | End: 2020-06-25

## 2020-06-21 RX ORDER — OXYTOCIN 10 UNIT/ML
333.33 VIAL (ML) INJECTION
Qty: 20 | Refills: 0 | Status: DISCONTINUED | OUTPATIENT
Start: 2020-06-21 | End: 2020-06-21

## 2020-06-21 RX ORDER — ACETAMINOPHEN 500 MG
975 TABLET ORAL
Refills: 0 | Status: DISCONTINUED | OUTPATIENT
Start: 2020-06-21 | End: 2020-06-25

## 2020-06-21 RX ORDER — AMPICILLIN TRIHYDRATE 250 MG
2 CAPSULE ORAL EVERY 6 HOURS
Refills: 0 | Status: DISCONTINUED | OUTPATIENT
Start: 2020-06-21 | End: 2020-06-22

## 2020-06-21 RX ORDER — METOCLOPRAMIDE HCL 10 MG
10 TABLET ORAL ONCE
Refills: 0 | Status: DISCONTINUED | OUTPATIENT
Start: 2020-06-21 | End: 2020-06-21

## 2020-06-21 RX ORDER — LANOLIN
1 OINTMENT (GRAM) TOPICAL EVERY 6 HOURS
Refills: 0 | Status: DISCONTINUED | OUTPATIENT
Start: 2020-06-21 | End: 2020-06-25

## 2020-06-21 RX ADMIN — Medication 216 GRAM(S): at 21:30

## 2020-06-21 RX ADMIN — ENOXAPARIN SODIUM 40 MILLIGRAM(S): 100 INJECTION SUBCUTANEOUS at 23:26

## 2020-06-21 RX ADMIN — Medication 50 GM/HR: at 18:00

## 2020-06-21 RX ADMIN — Medication 100 MILLIGRAM(S): at 23:43

## 2020-06-21 RX ADMIN — SODIUM CHLORIDE 125 MILLILITER(S): 9 INJECTION, SOLUTION INTRAVENOUS at 04:24

## 2020-06-21 RX ADMIN — Medication 208 GRAM(S): at 00:31

## 2020-06-21 RX ADMIN — Medication 200 GRAM(S): at 17:37

## 2020-06-21 RX ADMIN — Medication 975 MILLIGRAM(S): at 18:52

## 2020-06-21 RX ADMIN — Medication 100 MILLIGRAM(S): at 16:19

## 2020-06-21 RX ADMIN — SODIUM CHLORIDE 500 MILLILITER(S): 9 INJECTION, SOLUTION INTRAVENOUS at 14:41

## 2020-06-21 RX ADMIN — SODIUM CHLORIDE 1000 MILLILITER(S): 9 INJECTION, SOLUTION INTRAVENOUS at 17:10

## 2020-06-21 RX ADMIN — Medication 100 MILLIGRAM(S): at 07:54

## 2020-06-21 RX ADMIN — Medication 216 GRAM(S): at 08:44

## 2020-06-21 RX ADMIN — Medication 30 MILLIGRAM(S): at 21:45

## 2020-06-21 RX ADMIN — Medication 116 MILLIGRAM(S): at 21:31

## 2020-06-21 RX ADMIN — Medication 1000 MILLIUNIT(S)/MIN: at 09:38

## 2020-06-21 RX ADMIN — Medication 1000 MILLIUNIT(S)/MIN: at 10:50

## 2020-06-21 RX ADMIN — Medication 216 GRAM(S): at 15:01

## 2020-06-21 RX ADMIN — Medication 975 MILLIGRAM(S): at 23:28

## 2020-06-21 RX ADMIN — Medication 1000 MILLIUNIT(S)/MIN: at 10:06

## 2020-06-21 RX ADMIN — Medication 30 MILLIGRAM(S): at 16:19

## 2020-06-21 RX ADMIN — FAMOTIDINE 20 MILLIGRAM(S): 10 INJECTION INTRAVENOUS at 08:00

## 2020-06-21 RX ADMIN — SODIUM CHLORIDE 75 MILLILITER(S): 9 INJECTION, SOLUTION INTRAVENOUS at 17:31

## 2020-06-21 RX ADMIN — Medication 30 MILLILITER(S): at 08:00

## 2020-06-21 RX ADMIN — Medication 208 GRAM(S): at 04:33

## 2020-06-21 NOTE — PROGRESS NOTE ADULT - SUBJECTIVE AND OBJECTIVE BOX
Pt's labs came back significant for Cr > 1.1.   Starting on IV magnesium for PEC w/ SF.  Pt denies HA, scotoma, RUQ pain, SOB. CP.    Vital Signs Last 24 Hrs  T(C): 36.1 (21 Jun 2020 17:00), Max: 36.8 (21 Jun 2020 10:53)  T(F): 97 (21 Jun 2020 17:00), Max: 98.2 (21 Jun 2020 10:53)  HR: 83 (21 Jun 2020 17:00) (66 - 83)  BP: 142/61 (21 Jun 2020 17:00) (113/58 - 162/72)  BP(mean): --  RR: 16 (21 Jun 2020 17:00) (13 - 18)  SpO2: 94% (21 Jun 2020 17:00) (93% - 100%)    Plan to start IV magnesium, first Mg check with full labs at 23:00 Pt's labs came back significant for Cr > 1.42.   Starting on IV magnesium for PEC w/ SF.  Pt denies HA, scotoma, RUQ pain, SOB, CP.    Vital Signs Last 24 Hrs  T(C): 36.1 (21 Jun 2020 17:00), Max: 36.8 (21 Jun 2020 10:53)  T(F): 97 (21 Jun 2020 17:00), Max: 98.2 (21 Jun 2020 10:53)  HR: 83 (21 Jun 2020 17:00) (66 - 83)  BP: 142/61 (21 Jun 2020 17:00) (113/58 - 162/72)  BP(mean): --  RR: 16 (21 Jun 2020 17:00) (13 - 18)  SpO2: 94% (21 Jun 2020 17:00) (93% - 100%)  Gen: no acute distress  UOP: 40cc for past hour    Plan to start IV magnesium, first Mg check with full labs at 23:00. Patient evaluated at bedside after her labs came back significant for Cr > 1.42. Patient's hourly urine output has been downtrending. Pt denies HA, scotoma, RUQ pain, SOB, CP. Pain is well controlled.     Vital Signs Last 24 Hrs  T(C): 36.1 (21 Jun 2020 17:00), Max: 36.8 (21 Jun 2020 10:53)  T(F): 97 (21 Jun 2020 17:00), Max: 98.2 (21 Jun 2020 10:53)  HR: 83 (21 Jun 2020 17:00) (66 - 83)  BP: 142/61 (21 Jun 2020 17:00) (113/58 - 162/72)  BP(mean): --  RR: 16 (21 Jun 2020 17:00) (13 - 18)  SpO2: 94% (21 Jun 2020 17:00) (93% - 100%)  Gen: no acute distress  UOP: 40cc for past hour    Given persistent mild range BPs and Cr, will start IV magnesium for PEC w/ SF. First Mg check with full labs at 23:00.     Plan as discussed with Dr. Lozano reviewed with patient who has no further questions.

## 2020-06-22 LAB
ALBUMIN SERPL ELPH-MCNC: 2.5 G/DL — LOW (ref 3.3–5)
ALBUMIN SERPL ELPH-MCNC: 2.8 G/DL — LOW (ref 3.3–5)
ALP SERPL-CCNC: 188 U/L — HIGH (ref 40–120)
ALP SERPL-CCNC: 219 U/L — HIGH (ref 40–120)
ALT FLD-CCNC: 22 U/L — SIGNIFICANT CHANGE UP (ref 10–45)
ALT FLD-CCNC: 23 U/L — SIGNIFICANT CHANGE UP (ref 10–45)
ANION GAP SERPL CALC-SCNC: 11 MMOL/L — SIGNIFICANT CHANGE UP (ref 5–17)
ANION GAP SERPL CALC-SCNC: 11 MMOL/L — SIGNIFICANT CHANGE UP (ref 5–17)
APTT BLD: 27.2 SEC — LOW (ref 27.5–36.3)
APTT BLD: 31.5 SEC — SIGNIFICANT CHANGE UP (ref 27.5–36.3)
AST SERPL-CCNC: 32 U/L — SIGNIFICANT CHANGE UP (ref 10–40)
AST SERPL-CCNC: 38 U/L — SIGNIFICANT CHANGE UP (ref 10–40)
BILIRUB SERPL-MCNC: 0.4 MG/DL — SIGNIFICANT CHANGE UP (ref 0.2–1.2)
BILIRUB SERPL-MCNC: 0.6 MG/DL — SIGNIFICANT CHANGE UP (ref 0.2–1.2)
BUN SERPL-MCNC: 13 MG/DL — SIGNIFICANT CHANGE UP (ref 7–23)
BUN SERPL-MCNC: 14 MG/DL — SIGNIFICANT CHANGE UP (ref 7–23)
CALCIUM SERPL-MCNC: 7.9 MG/DL — LOW (ref 8.4–10.5)
CALCIUM SERPL-MCNC: 8.3 MG/DL — LOW (ref 8.4–10.5)
CHLORIDE SERPL-SCNC: 101 MMOL/L — SIGNIFICANT CHANGE UP (ref 96–108)
CHLORIDE SERPL-SCNC: 104 MMOL/L — SIGNIFICANT CHANGE UP (ref 96–108)
CO2 SERPL-SCNC: 18 MMOL/L — LOW (ref 22–31)
CO2 SERPL-SCNC: 21 MMOL/L — LOW (ref 22–31)
CREAT SERPL-MCNC: 1.36 MG/DL — HIGH (ref 0.5–1.3)
CREAT SERPL-MCNC: 1.46 MG/DL — HIGH (ref 0.5–1.3)
FIBRINOGEN PPP-MCNC: 363 MG/DL — SIGNIFICANT CHANGE UP (ref 258–438)
FIBRINOGEN PPP-MCNC: 371 MG/DL — SIGNIFICANT CHANGE UP (ref 258–438)
GLUCOSE SERPL-MCNC: 111 MG/DL — HIGH (ref 70–99)
GLUCOSE SERPL-MCNC: 134 MG/DL — HIGH (ref 70–99)
HCT VFR BLD CALC: 26.2 % — LOW (ref 34.5–45)
HCT VFR BLD CALC: 29.9 % — LOW (ref 34.5–45)
HGB BLD-MCNC: 8.7 G/DL — LOW (ref 11.5–15.5)
HGB BLD-MCNC: 9.9 G/DL — LOW (ref 11.5–15.5)
INR BLD: 0.97 — SIGNIFICANT CHANGE UP (ref 0.88–1.16)
INR BLD: 0.98 — SIGNIFICANT CHANGE UP (ref 0.88–1.16)
MAGNESIUM SERPL-MCNC: 4.6 MG/DL — HIGH (ref 1.6–2.6)
MAGNESIUM SERPL-MCNC: 5.3 MG/DL — HIGH (ref 1.6–2.6)
MAGNESIUM SERPL-MCNC: 7.3 MG/DL — CRITICAL HIGH (ref 1.6–2.6)
MCHC RBC-ENTMCNC: 26.2 PG — LOW (ref 27–34)
MCHC RBC-ENTMCNC: 26.6 PG — LOW (ref 27–34)
MCHC RBC-ENTMCNC: 33.1 GM/DL — SIGNIFICANT CHANGE UP (ref 32–36)
MCHC RBC-ENTMCNC: 33.2 GM/DL — SIGNIFICANT CHANGE UP (ref 32–36)
MCV RBC AUTO: 79.1 FL — LOW (ref 80–100)
MCV RBC AUTO: 80.1 FL — SIGNIFICANT CHANGE UP (ref 80–100)
NRBC # BLD: 0 /100 WBCS — SIGNIFICANT CHANGE UP (ref 0–0)
NRBC # BLD: 0 /100 WBCS — SIGNIFICANT CHANGE UP (ref 0–0)
PLATELET # BLD AUTO: 120 K/UL — LOW (ref 150–400)
PLATELET # BLD AUTO: 126 K/UL — LOW (ref 150–400)
POTASSIUM SERPL-MCNC: 4.2 MMOL/L — SIGNIFICANT CHANGE UP (ref 3.5–5.3)
POTASSIUM SERPL-MCNC: 4.7 MMOL/L — SIGNIFICANT CHANGE UP (ref 3.5–5.3)
POTASSIUM SERPL-SCNC: 4.2 MMOL/L — SIGNIFICANT CHANGE UP (ref 3.5–5.3)
POTASSIUM SERPL-SCNC: 4.7 MMOL/L — SIGNIFICANT CHANGE UP (ref 3.5–5.3)
PROT SERPL-MCNC: 5.2 G/DL — LOW (ref 6–8.3)
PROT SERPL-MCNC: 5.3 G/DL — LOW (ref 6–8.3)
PROTHROM AB SERPL-ACNC: 11.1 SEC — SIGNIFICANT CHANGE UP (ref 10–12.9)
PROTHROM AB SERPL-ACNC: 11.2 SEC — SIGNIFICANT CHANGE UP (ref 10–12.9)
RBC # BLD: 3.27 M/UL — LOW (ref 3.8–5.2)
RBC # BLD: 3.78 M/UL — LOW (ref 3.8–5.2)
RBC # FLD: 16.2 % — HIGH (ref 10.3–14.5)
RBC # FLD: 16.6 % — HIGH (ref 10.3–14.5)
SODIUM SERPL-SCNC: 133 MMOL/L — LOW (ref 135–145)
SODIUM SERPL-SCNC: 133 MMOL/L — LOW (ref 135–145)
WBC # BLD: 22.05 K/UL — HIGH (ref 3.8–10.5)
WBC # BLD: 24.58 K/UL — HIGH (ref 3.8–10.5)
WBC # FLD AUTO: 22.05 K/UL — HIGH (ref 3.8–10.5)
WBC # FLD AUTO: 24.58 K/UL — HIGH (ref 3.8–10.5)

## 2020-06-22 RX ORDER — IBUPROFEN 200 MG
600 TABLET ORAL EVERY 6 HOURS
Refills: 0 | Status: DISCONTINUED | OUTPATIENT
Start: 2020-06-22 | End: 2020-06-24

## 2020-06-22 RX ADMIN — Medication 100 MILLIGRAM(S): at 07:31

## 2020-06-22 RX ADMIN — Medication 975 MILLIGRAM(S): at 06:38

## 2020-06-22 RX ADMIN — ENOXAPARIN SODIUM 40 MILLIGRAM(S): 100 INJECTION SUBCUTANEOUS at 12:37

## 2020-06-22 RX ADMIN — ENOXAPARIN SODIUM 40 MILLIGRAM(S): 100 INJECTION SUBCUTANEOUS at 23:19

## 2020-06-22 RX ADMIN — Medication 25 GM/HR: at 09:09

## 2020-06-22 RX ADMIN — Medication 975 MILLIGRAM(S): at 23:16

## 2020-06-22 RX ADMIN — SIMETHICONE 80 MILLIGRAM(S): 80 TABLET, CHEWABLE ORAL at 14:43

## 2020-06-22 RX ADMIN — Medication 30 MILLIGRAM(S): at 03:27

## 2020-06-22 RX ADMIN — Medication 216 GRAM(S): at 09:02

## 2020-06-22 RX ADMIN — Medication 975 MILLIGRAM(S): at 12:37

## 2020-06-22 RX ADMIN — Medication 30 MILLIGRAM(S): at 09:02

## 2020-06-22 RX ADMIN — Medication 975 MILLIGRAM(S): at 18:03

## 2020-06-22 RX ADMIN — Medication 216 GRAM(S): at 03:22

## 2020-06-22 NOTE — PROGRESS NOTE ADULT - SUBJECTIVE AND OBJECTIVE BOX
Patient evaluated at bedside for clinical magnesium check. Serum magnesium to be drawn at 12:00. Reports a 2/10 headache, just took a dose of tylenol. She denies visual disturbances including scotoma, and right upper quadrant pain. Also denies nausea/vomiting/epigastric pain/shortness of breath. Pain well controlled.      T(C): 36.6 (06-22-20 @ 10:20), Max: 36.8 (06-22-20 @ 02:00)  HR: 65 (06-22-20 @ 10:20) (65 - 74)  BP: 125/67 (06-22-20 @ 10:20) (118/67 - 133/65)  RR: 17 (06-22-20 @ 10:20) (17 - 18)  SpO2: 97% (06-22-20 @ 10:20) (96% - 99%)  Wt(kg): --  Daily     Daily     06-21 @ 07:01  -  06-22 @ 07:00  --------------------------------------------------------  IN: 2525 mL / OUT: 3465 mL / NET: -940 mL    06-22 @ 07:01  -  06-22 @ 12:42  --------------------------------------------------------  IN: 625 mL / OUT: 675 mL / NET: -50 mL      Gen: NAD, AAOx3  CV: RRR, no M/R/G  Pulm: CTAB, no R/R/W  Abd: soft, nontender, no rebound or guarding, no epigastric tenderness, liver nonpalpable +BS, fundus palpated   : Lopez in place  Ext: +1 edema dane, SCDs in place, Reflexes 2+                          8.7    22.05 )-----------( 120      ( 22 Jun 2020 12:20 )             26.2     06-22    133<L>  |  104  |  13  ----------------------------<  134<H>  4.7   |  18<L>  |  1.36<H>    Ca    8.3<L>      22 Jun 2020 00:25  Mg     7.3     06-22    TPro  5.2<L>  /  Alb  2.5<L>  /  TBili  0.6  /  DBili  x   /  AST  38  /  ALT  23  /  AlkPhos  219<H>  06-22    acetaminophen   Tablet .. 975 milliGRAM(s) Oral <User Schedule>  diphenhydrAMINE 25 milliGRAM(s) Oral every 6 hours PRN  diphtheria/tetanus/pertussis (acellular) Vaccine (ADAcel) 0.5 milliLiter(s) IntraMuscular once  enoxaparin Injectable 40 milliGRAM(s) SubCutaneous every 12 hours  ibuprofen  Tablet. 600 milliGRAM(s) Oral every 6 hours  lactated ringers. 1000 milliLiter(s) IV Continuous <Continuous>  lanolin Ointment 1 Application(s) Topical every 6 hours PRN  magnesium hydroxide Suspension 30 milliLiter(s) Oral two times a day PRN  magnesium sulfate Infusion 1 Gm/Hr IV Continuous <Continuous>  morphine PF Epidural 3 milliGRAM(s) Epidural once  naloxone Injectable 0.1 milliGRAM(s) IV Push every 3 minutes PRN  ondansetron Injectable 4 milliGRAM(s) IV Push every 6 hours PRN  oxyCODONE    IR 5 milliGRAM(s) Oral every 3 hours PRN  oxyCODONE    IR 5 milliGRAM(s) Oral once PRN  oxytocin Infusion 333.333 milliUNIT(s)/Min IV Continuous <Continuous>  simethicone 80 milliGRAM(s) Chew every 4 hours PRN      06-21-20 @ 07:01  -  06-22-20 @ 07:00  --------------------------------------------------------  IN: 2525 mL / OUT: 3465 mL / NET: -940 mL    06-22-20 @ 07:01  - 06-22-20 @ 12:42  --------------------------------------------------------  IN: 625 mL / OUT: 675 mL / NET: -50 mL

## 2020-06-22 NOTE — LACTATION INITIAL EVALUATION - NS LACT CON REASON FOR REQ
primaparous mom/Mother s/p PPH x2L, currently on mag, HepB+. Baby s/p NICU, received numerous bottles. Mother and RN report difficult latch d/t mother's large, flat, non-compressible nipple. Baby also showing signs of nipple preference. Upon visit, baby showing feedings cues, mother about to attempt latch. Baby placed in football hold on right side, very active and persistent with attempts. Baby closes mouth too quickly to take in entire nipple, falls off breast or bites with jaw. Became agitated, fed baby 4mls formula with bottle and baby fell asleep. Small amounts of colostrum seen at both breasts with HE. TFP initiated and explained to mother in detail. Mother expresses strong motivation to breastfeed, discouraged by "nothing coming out" with pump. Milk development explained. Mother encouraged by seeing colostrum. Instructed to perform plenty of SSC, room-in. Answered questions. Mother knows to ask for LC assistance as needed. Will follow. Baby 40.2 wks GA, currently 30 hours old, 3675 grams, lost 1.5% birthweight.

## 2020-06-22 NOTE — PROGRESS NOTE ADULT - SUBJECTIVE AND OBJECTIVE BOX
Patient evaluated at bedside for clinical magnesium check. Reports headache is now 7/10 but  denies visual disturbances. She is feeling groggy and exhausted. Reports she would like to get some sleep and see how she feels when she wakes up. Recently received toradol and tylenol. Denies Right upper quadrant pain. Also denies nausea/vomiting/epigastric pain/shortness of breath. Pain well controlled.      T(C): 36.6 (06-22-20 @ 04:00), Max: 36.8 (06-22-20 @ 02:00)  HR: 74 (06-22-20 @ 04:00) (65 - 76)  BP: 118/67 (06-22-20 @ 04:00) (105/66 - 142/81)  RR: 18 (06-22-20 @ 04:00) (15 - 18)  SpO2: 99% (06-22-20 @ 04:00) (93% - 99%)  Wt(kg): --    Gen: NAD  Pulm: CTAB  Abd: soft, nontender, no rebound or guarding, no epigastric tenderness, liver nonpalpable +BS, fundus palpated, provena in place  : Lopez in place  Ext: Reflexes 2+ b/l, b/l LE edema nonpitting                          9.9    24.58 )-----------( 126      ( 22 Jun 2020 00:25 )             29.9     06-22    133<L>  |  104  |  13  ----------------------------<  134<H>  4.7   |  18<L>  |  1.36<H>    Ca    8.3<L>      22 Jun 2020 00:25  Mg     4.6     06-22    TPro  5.2<L>  /  Alb  2.5<L>  /  TBili  0.6  /  DBili  x   /  AST  38  /  ALT  23  /  AlkPhos  219<H>  06-22 06-20-20 @ 07:01  -  06-21-20 @ 07:00  --------------------------------------------------------  IN: 3000 mL / OUT: 250 mL / NET: 2750 mL    06-21-20 @ 07:01  -  06-22-20 @ 05:17  --------------------------------------------------------  IN: 1875 mL / OUT: 3290 mL / NET: -1415 mL

## 2020-06-22 NOTE — PROGRESS NOTE ADULT - ASSESSMENT
A&P:   35y  POD1 s/p c/s for arrest of dilitation complicated by preeclampsia with severe features      1. Preeclampsia: Continue IV Magnesium @2G/hr for 24 hrs post delivery.  Mild headache improving with OPM  Antihypertensives: none  Continue to monitor blood pressures  Next Magnesium check @ 06:00  Follow up on Magnesium serum level     2. GI: Clears, until Magnesium is stopped    3. : strict Is and Os, D/C [place] after discontinuation of IV Magnesium

## 2020-06-22 NOTE — PROGRESS NOTE ADULT - SUBJECTIVE AND OBJECTIVE BOX
Patient evaluated at bedside for clinical magnesium check. Endorsing mild headache that has improved with tylenol and motrin, now 2/10.   She denies visual disturbances including scotoma, and right upper quadrant pain. Also denies nausea/vomiting/epigastric pain/shortness of breath. Pain well controlled.      T(C): 36.4 (06-22-20 @ 00:00), Max: 36.7 (06-21-20 @ 22:00)  HR: 70 (06-22-20 @ 00:00) (65 - 83)  BP: 105/66 (06-22-20 @ 00:00) (105/66 - 154/75)  RR: 18 (06-22-20 @ 00:00) (13 - 18)  SpO2: 95% (06-22-20 @ 00:00) (93% - 98%)  Wt(kg): --    Gen: NAD  Pulm: CTAB  Abd: soft, nontender, no rebound or guarding, no epigastric tenderness, liver nonpalpable +BS, fundus palpated, provena in place  : Lopez in place  Ext: Reflexes 2+ b/l                          10.9   19.49 )-----------( 125      ( 21 Jun 2020 15:12 )             33.4     06-21    137  |  106  |  10  ----------------------------<  111<H>  4.7   |  20<L>  |  1.42<H>    Ca    8.3<L>      21 Jun 2020 15:12    TPro  5.4<L>  /  Alb  2.7<L>  /  TBili  1.1  /  DBili  x   /  AST  50<H>  /  ALT  25  /  AlkPhos  252<H>  06-21 06-20-20 @ 07:01  -  06-21-20 @ 07:00  --------------------------------------------------------  IN: 3000 mL / OUT: 250 mL / NET: 2750 mL    06-21-20 @ 07:01  -  06-22-20 @ 00:21  --------------------------------------------------------  IN: 725 mL / OUT: 2990 mL / NET: -2261 mL

## 2020-06-22 NOTE — PROGRESS NOTE ADULT - ASSESSMENT
A&P:   35y  POD1 s/p c/s for arrest of dilitation complicated by preeclampsia with severe features      1. Preeclampsia: Continue IV Magnesium @2G/hr for 24 hrs post delivery.  Headache worsening, patient attributes it to lack of sleep. Will reassess in 1hour after toradol/tylenol.  Antihypertensives: none  Continue to monitor blood pressures  Next Magnesium check @ 12:00  Follow up on Magnesium serum level     2. GI: Clears, until Magnesium is stopped    3. : strict Is and Os, D/C [place] after discontinuation of IV Magnesium

## 2020-06-22 NOTE — PROGRESS NOTE ADULT - ASSESSMENT
A&P: 35y  s/p primary  section for arrest of dilation complicated by chorioamnionitis and preeclampsia with severe features. Patient is clinically stable and improving.   1. Preeclampsia: Continue IV Magnesium @1.5G/hr for 24 hrs post delivery. Magnesium clinical checks and serum assay q6 hrs. No complaints currently. BP well controlled. Holding NSAIDS given Cr, which is downtrending but still significantly elevated.   2. Chorioamnionitis: patient is afebrile; DC antibiotics; no fundal tenderness   3. GI: Diet: advance diet as tolerated   4. Neuro: Oral pain medications as needed: hold NSAIDs  5. : strict Is and Os, D/C [place] after discontinuation of IV Magnesium; patient is making adequate urine   6. History of VTE- continued lovenox 40 mg BID for 6 weeks postpartum   7. Continue routine postpartum care

## 2020-06-22 NOTE — PROGRESS NOTE ADULT - SUBJECTIVE AND OBJECTIVE BOX
Delayed entry secondary to patient care:     Patient evaluated at bedside. She reports pain is well controlled. Patient reports that she feels very tired but that she feels she is improving. She denies headache, dizziness, chest pain, palpitations, shortness of breathe, nausea, vomiting or heavy vaginal bleeding. She has passed a small amount of flatus and is tolerating clears. She has not yet ambulated secondary to remaining on the magnesium. Lopez remains in place.        Physical Exam:  Vital Signs Last 24 Hrs  T(C): 36.6 (22 Jun 2020 10:20), Max: 36.8 (22 Jun 2020 02:00)  T(F): 97.9 (22 Jun 2020 10:20), Max: 98.3 (22 Jun 2020 02:00)  HR: 65 (22 Jun 2020 10:20) (65 - 83)  BP: 125/67 (22 Jun 2020 10:20) (105/66 - 154/75)  BP(mean): --  RR: 17 (22 Jun 2020 10:20) (13 - 18)  SpO2: 97% (22 Jun 2020 10:20) (93% - 99%)    06-21 @ 07:01  -  06-22 @ 07:00  --------------------------------------------------------  IN: 2525 mL / OUT: 3465 mL / NET: -940 mL    06-22 @ 07:01  -  06-22 @ 12:13  --------------------------------------------------------  IN: 625 mL / OUT: 675 mL / NET: -50 mL        GA: NAD, resting comfortably   CV: RRR  Pulm: Normal work of breathing, CTAB  Abd: + BS, soft, appropriately tender, nondistended, no rebound or guarding, obese   Incision: prevena in place   : lochia WNL  Extremities: symmetric edema bilaterally, nontender, 2+ reflexes                              9.9    24.58 )-----------( 126      ( 22 Jun 2020 00:25 )             29.9     06-22    133<L>  |  104  |  13  ----------------------------<  134<H>  4.7   |  18<L>  |  1.36<H>    Ca    8.3<L>      22 Jun 2020 00:25  Mg     7.3     06-22    TPro  5.2<L>  /  Alb  2.5<L>  /  TBili  0.6  /  DBili  x   /  AST  38  /  ALT  23  /  AlkPhos  219<H>  06-22    Magnesium, Serum: 7.3 mg/dL (06-22 @ 06:49)  Magnesium, Serum: 4.6 mg/dL (06-22 @ 00:25)    PT/INR - ( 22 Jun 2020 00:25 )   PT: 11.2 sec;   INR: 0.98          PTT - ( 22 Jun 2020 00:25 )  PTT:31.5 sec

## 2020-06-22 NOTE — PROGRESS NOTE ADULT - ASSESSMENT
A&P: 35y  s/p primary  section for arrest of dilation complicated by chorioamnionitis and preeclampsia with severe features. Patient is clinically stable and improving.   1. Preeclampsia: Continue IV Magnesium @1G/hr for 24 hrs post delivery. Magnesium clinical checks and serum assay q6 hrs. Mild headache currently but just took tylenol. BP well controlled. Holding NSAIDS given Cr, which is downtrending but still significantly elevated.   2. Chorioamnionitis: patient is afebrile; DC antibiotics; no fundal tenderness   3. GI: Diet: advance diet as tolerated   4. Neuro: Oral pain medications as needed: hold NSAIDs  5. : strict Is and Os, D/C [place] after discontinuation of IV Magnesium; patient is making adequate urine   6. History of VTE- continued lovenox 40 mg BID for 6 weeks postpartum   7. Continue routine postpartum care

## 2020-06-23 ENCOUNTER — APPOINTMENT (OUTPATIENT)
Dept: ANTEPARTUM | Facility: CLINIC | Age: 35
End: 2020-06-23

## 2020-06-23 ENCOUNTER — APPOINTMENT (OUTPATIENT)
Dept: OBGYN | Facility: CLINIC | Age: 35
End: 2020-06-23

## 2020-06-23 DIAGNOSIS — R60.0 LOCALIZED EDEMA: ICD-10-CM

## 2020-06-23 LAB
ALBUMIN SERPL ELPH-MCNC: 2.6 G/DL — LOW (ref 3.3–5)
ALP SERPL-CCNC: 174 U/L — HIGH (ref 40–120)
ALT FLD-CCNC: 20 U/L — SIGNIFICANT CHANGE UP (ref 10–45)
ANION GAP SERPL CALC-SCNC: 10 MMOL/L — SIGNIFICANT CHANGE UP (ref 5–17)
ANION GAP SERPL CALC-SCNC: 9 MMOL/L — SIGNIFICANT CHANGE UP (ref 5–17)
APTT BLD: 30.7 SEC — SIGNIFICANT CHANGE UP (ref 27.5–36.3)
AST SERPL-CCNC: 27 U/L — SIGNIFICANT CHANGE UP (ref 10–40)
BASOPHILS # BLD AUTO: 0.03 K/UL — SIGNIFICANT CHANGE UP (ref 0–0.2)
BASOPHILS NFR BLD AUTO: 0.2 % — SIGNIFICANT CHANGE UP (ref 0–2)
BILIRUB SERPL-MCNC: 0.3 MG/DL — SIGNIFICANT CHANGE UP (ref 0.2–1.2)
BUN SERPL-MCNC: 17 MG/DL — SIGNIFICANT CHANGE UP (ref 7–23)
BUN SERPL-MCNC: 19 MG/DL — SIGNIFICANT CHANGE UP (ref 7–23)
CALCIUM SERPL-MCNC: 8 MG/DL — LOW (ref 8.4–10.5)
CALCIUM SERPL-MCNC: 8.3 MG/DL — LOW (ref 8.4–10.5)
CHLORIDE SERPL-SCNC: 104 MMOL/L — SIGNIFICANT CHANGE UP (ref 96–108)
CHLORIDE SERPL-SCNC: 107 MMOL/L — SIGNIFICANT CHANGE UP (ref 96–108)
CO2 SERPL-SCNC: 23 MMOL/L — SIGNIFICANT CHANGE UP (ref 22–31)
CO2 SERPL-SCNC: 23 MMOL/L — SIGNIFICANT CHANGE UP (ref 22–31)
CREAT ?TM UR-MCNC: 43 MG/DL — SIGNIFICANT CHANGE UP
CREAT SERPL-MCNC: 1.44 MG/DL — HIGH (ref 0.5–1.3)
CREAT SERPL-MCNC: 1.47 MG/DL — HIGH (ref 0.5–1.3)
EOSINOPHIL # BLD AUTO: 0.05 K/UL — SIGNIFICANT CHANGE UP (ref 0–0.5)
EOSINOPHIL NFR BLD AUTO: 0.3 % — SIGNIFICANT CHANGE UP (ref 0–6)
FIBRINOGEN PPP-MCNC: 363 MG/DL — SIGNIFICANT CHANGE UP (ref 258–438)
GLUCOSE SERPL-MCNC: 79 MG/DL — SIGNIFICANT CHANGE UP (ref 70–99)
GLUCOSE SERPL-MCNC: 84 MG/DL — SIGNIFICANT CHANGE UP (ref 70–99)
HCT VFR BLD CALC: 25.7 % — LOW (ref 34.5–45)
HGB BLD-MCNC: 8.5 G/DL — LOW (ref 11.5–15.5)
IMM GRANULOCYTES NFR BLD AUTO: 0.5 % — SIGNIFICANT CHANGE UP (ref 0–1.5)
INR BLD: 0.91 — SIGNIFICANT CHANGE UP (ref 0.88–1.16)
LDH SERPL L TO P-CCNC: 208 U/L — SIGNIFICANT CHANGE UP (ref 50–242)
LYMPHOCYTES # BLD AUTO: 13.9 % — SIGNIFICANT CHANGE UP (ref 13–44)
LYMPHOCYTES # BLD AUTO: 2.21 K/UL — SIGNIFICANT CHANGE UP (ref 1–3.3)
MCHC RBC-ENTMCNC: 26 PG — LOW (ref 27–34)
MCHC RBC-ENTMCNC: 33.1 GM/DL — SIGNIFICANT CHANGE UP (ref 32–36)
MCV RBC AUTO: 78.6 FL — LOW (ref 80–100)
MONOCYTES # BLD AUTO: 0.72 K/UL — SIGNIFICANT CHANGE UP (ref 0–0.9)
MONOCYTES NFR BLD AUTO: 4.5 % — SIGNIFICANT CHANGE UP (ref 2–14)
NEUTROPHILS # BLD AUTO: 12.81 K/UL — HIGH (ref 1.8–7.4)
NEUTROPHILS NFR BLD AUTO: 80.6 % — HIGH (ref 43–77)
NRBC # BLD: 0 /100 WBCS — SIGNIFICANT CHANGE UP (ref 0–0)
PLATELET # BLD AUTO: 119 K/UL — LOW (ref 150–400)
POTASSIUM SERPL-MCNC: 3.9 MMOL/L — SIGNIFICANT CHANGE UP (ref 3.5–5.3)
POTASSIUM SERPL-MCNC: 4.2 MMOL/L — SIGNIFICANT CHANGE UP (ref 3.5–5.3)
POTASSIUM SERPL-SCNC: 3.9 MMOL/L — SIGNIFICANT CHANGE UP (ref 3.5–5.3)
POTASSIUM SERPL-SCNC: 4.2 MMOL/L — SIGNIFICANT CHANGE UP (ref 3.5–5.3)
PROT SERPL-MCNC: 5.2 G/DL — LOW (ref 6–8.3)
PROTHROM AB SERPL-ACNC: 10.3 SEC — SIGNIFICANT CHANGE UP (ref 10–12.9)
RBC # BLD: 3.27 M/UL — LOW (ref 3.8–5.2)
RBC # FLD: 16.8 % — HIGH (ref 10.3–14.5)
SODIUM SERPL-SCNC: 136 MMOL/L — SIGNIFICANT CHANGE UP (ref 135–145)
SODIUM SERPL-SCNC: 140 MMOL/L — SIGNIFICANT CHANGE UP (ref 135–145)
SODIUM UR-SCNC: 22 MMOL/L — SIGNIFICANT CHANGE UP
URATE SERPL-MCNC: 6.7 MG/DL — SIGNIFICANT CHANGE UP (ref 2.5–7)
WBC # BLD: 15.9 K/UL — HIGH (ref 3.8–10.5)
WBC # FLD AUTO: 15.9 K/UL — HIGH (ref 3.8–10.5)

## 2020-06-23 RX ORDER — ENOXAPARIN SODIUM 100 MG/ML
40 INJECTION SUBCUTANEOUS
Qty: 3360 | Refills: 0
Start: 2020-06-23 | End: 2020-08-03

## 2020-06-23 RX ADMIN — Medication 975 MILLIGRAM(S): at 11:49

## 2020-06-23 RX ADMIN — SIMETHICONE 80 MILLIGRAM(S): 80 TABLET, CHEWABLE ORAL at 01:30

## 2020-06-23 RX ADMIN — ENOXAPARIN SODIUM 40 MILLIGRAM(S): 100 INJECTION SUBCUTANEOUS at 11:49

## 2020-06-23 RX ADMIN — Medication 975 MILLIGRAM(S): at 23:37

## 2020-06-23 RX ADMIN — SIMETHICONE 80 MILLIGRAM(S): 80 TABLET, CHEWABLE ORAL at 17:06

## 2020-06-23 RX ADMIN — SIMETHICONE 80 MILLIGRAM(S): 80 TABLET, CHEWABLE ORAL at 06:00

## 2020-06-23 RX ADMIN — Medication 975 MILLIGRAM(S): at 17:06

## 2020-06-23 RX ADMIN — Medication 975 MILLIGRAM(S): at 06:35

## 2020-06-23 RX ADMIN — ENOXAPARIN SODIUM 40 MILLIGRAM(S): 100 INJECTION SUBCUTANEOUS at 23:37

## 2020-06-23 NOTE — PROGRESS NOTE ADULT - ASSESSMENT
A&P: 35y  s/p primary  section for arrest of dilation complicated by chorioamnionitis and preeclampsia with severe features. Patient is clinically stable and improving - POD2.   1. Preeclampsia: Patient is status post IV Magnesium for  24 hrs post delivery. Patient is currently normotensive. Holding NSAIDS given Cr, which is still significantly elevated. No other toxic complaints.   2. Chorioamnionitis: patient is afebrile; s/p antibiotics; no fundal tenderness   3. GI: Diet: Regular diet as tolerated   4. Neuro: Oral pain medications as needed: hold NSAIDs  5. :  patient is making adequate urine   6. History of VTE- continued lovenox 40 mg BID for 6 weeks postpartum   7. Continue routine postpartum care

## 2020-06-23 NOTE — PROGRESS NOTE ADULT - SUBJECTIVE AND OBJECTIVE BOX
Patient seen and evaluated.  Reports significant leg edema but no pain or other complaints.  Incision with prevena.   Patient is ambulating, tolerating diet.   Discussed PPH with the patient.  Discussed PP bleeding and what is considered normal.   Overall doing well post operatively

## 2020-06-23 NOTE — PROVIDER CONTACT NOTE (OTHER) - RECOMMENDATIONS
RN spoke to MD Elliott PGY-2 while doing rounds. Motrin to be held. RN asked if a possible Nephrology consult will be done, MD stated that consult will not be ordered for now.

## 2020-06-23 NOTE — PROVIDER CONTACT NOTE (OTHER) - BACKGROUND
Primary C/S arrest dilatation and chorio with gHTN on 6/21/0937AM, SP MAG Sulfate infusion D/C 6/22 at 1730. S/P 2units prbc due to PPH 2L.

## 2020-06-23 NOTE — PROGRESS NOTE ADULT - SUBJECTIVE AND OBJECTIVE BOX
Late entry secondary to patient care:     Patient evaluated at bedside.   She reports pain is well controlled. Patient's biggest complaint is the swelling in her legs. She is ambulating without difficulty and voiding spontaneously. She denies headache, dizziness, chest pain, palpitations, shortness of breathe, nausea, vomiting or heavy vaginal bleeding. She is passing gas, tolerating regular diet and is breastfeeding. No additional needs at this time.     Physical Exam:  Vital Signs Last 24 Hrs  T(C): 36.9 (23 Jun 2020 14:00), Max: 37.2 (23 Jun 2020 04:00)  T(F): 98.5 (23 Jun 2020 14:00), Max: 98.9 (23 Jun 2020 04:00)  HR: 67 (23 Jun 2020 14:00) (62 - 74)  BP: 130/76 (23 Jun 2020 14:00) (107/69 - 132/68)  BP(mean): 94 (23 Jun 2020 14:00) (82 - 94)  RR: 18 (23 Jun 2020 14:00) (18 - 19)  SpO2: 96% (23 Jun 2020 14:00) (96% - 100%)    06-22 @ 07:01  -  06-23 @ 07:00  --------------------------------------------------------  IN: 1250 mL / OUT: 4325 mL / NET: -3075 mL        GA: NAD, resting comfortably   Abd: + BS, soft, appropriately tender, obese, no rebound or guarding, not able to palpate fundus  Incision: prevena in place   : lochia WNL  Extremities: symmetric edema of the lower extremity that is nontender                            8.5    15.90 )-----------( 119      ( 23 Jun 2020 05:41 )             25.7     06-23    136  |  104  |  17  ----------------------------<  79  3.9   |  23  |  1.47<H>    Ca    8.0<L>      23 Jun 2020 05:41  Mg     5.3     06-22    TPro  5.2<L>  /  Alb  2.6<L>  /  TBili  0.3  /  DBili  x   /  AST  27  /  ALT  20  /  AlkPhos  174<H>  06-23      PT/INR - ( 23 Jun 2020 05:41 )   PT: 10.3 sec;   INR: 0.91          PTT - ( 23 Jun 2020 05:41 )  PTT:30.7 sec

## 2020-06-24 LAB
ALBUMIN SERPL ELPH-MCNC: 2.7 G/DL — LOW (ref 3.3–5)
ALP SERPL-CCNC: 146 U/L — HIGH (ref 40–120)
ALT FLD-CCNC: 27 U/L — SIGNIFICANT CHANGE UP (ref 10–45)
ANION GAP SERPL CALC-SCNC: 11 MMOL/L — SIGNIFICANT CHANGE UP (ref 5–17)
ANION GAP SERPL CALC-SCNC: 12 MMOL/L — SIGNIFICANT CHANGE UP (ref 5–17)
APPEARANCE UR: CLEAR — SIGNIFICANT CHANGE UP
AST SERPL-CCNC: 33 U/L — SIGNIFICANT CHANGE UP (ref 10–40)
BASOPHILS # BLD AUTO: 0.02 K/UL — SIGNIFICANT CHANGE UP (ref 0–0.2)
BASOPHILS NFR BLD AUTO: 0.2 % — SIGNIFICANT CHANGE UP (ref 0–2)
BILIRUB SERPL-MCNC: 0.3 MG/DL — SIGNIFICANT CHANGE UP (ref 0.2–1.2)
BILIRUB UR-MCNC: NEGATIVE — SIGNIFICANT CHANGE UP
BUN SERPL-MCNC: 18 MG/DL — SIGNIFICANT CHANGE UP (ref 7–23)
BUN SERPL-MCNC: 19 MG/DL — SIGNIFICANT CHANGE UP (ref 7–23)
CALCIUM SERPL-MCNC: 8.4 MG/DL — SIGNIFICANT CHANGE UP (ref 8.4–10.5)
CALCIUM SERPL-MCNC: 8.6 MG/DL — SIGNIFICANT CHANGE UP (ref 8.4–10.5)
CHLORIDE SERPL-SCNC: 106 MMOL/L — SIGNIFICANT CHANGE UP (ref 96–108)
CHLORIDE SERPL-SCNC: 107 MMOL/L — SIGNIFICANT CHANGE UP (ref 96–108)
CO2 SERPL-SCNC: 22 MMOL/L — SIGNIFICANT CHANGE UP (ref 22–31)
CO2 SERPL-SCNC: 23 MMOL/L — SIGNIFICANT CHANGE UP (ref 22–31)
COLOR SPEC: YELLOW — SIGNIFICANT CHANGE UP
CREAT ?TM UR-MCNC: 49 MG/DL — SIGNIFICANT CHANGE UP
CREAT SERPL-MCNC: 1.43 MG/DL — HIGH (ref 0.5–1.3)
CREAT SERPL-MCNC: 1.5 MG/DL — HIGH (ref 0.5–1.3)
DIFF PNL FLD: ABNORMAL
EOSINOPHIL # BLD AUTO: 0.13 K/UL — SIGNIFICANT CHANGE UP (ref 0–0.5)
EOSINOPHIL NFR BLD AUTO: 1.3 % — SIGNIFICANT CHANGE UP (ref 0–6)
GLUCOSE SERPL-MCNC: 69 MG/DL — LOW (ref 70–99)
GLUCOSE SERPL-MCNC: 75 MG/DL — SIGNIFICANT CHANGE UP (ref 70–99)
GLUCOSE UR QL: NEGATIVE — SIGNIFICANT CHANGE UP
HAPTOGLOB SERPL-MCNC: 125 MG/DL — SIGNIFICANT CHANGE UP (ref 34–200)
HCT VFR BLD CALC: 26.2 % — LOW (ref 34.5–45)
HGB BLD-MCNC: 8.6 G/DL — LOW (ref 11.5–15.5)
IMM GRANULOCYTES NFR BLD AUTO: 0.5 % — SIGNIFICANT CHANGE UP (ref 0–1.5)
KETONES UR-MCNC: NEGATIVE — SIGNIFICANT CHANGE UP
LDH SERPL L TO P-CCNC: 221 U/L — SIGNIFICANT CHANGE UP (ref 50–242)
LEUKOCYTE ESTERASE UR-ACNC: NEGATIVE — SIGNIFICANT CHANGE UP
LYMPHOCYTES # BLD AUTO: 1.4 K/UL — SIGNIFICANT CHANGE UP (ref 1–3.3)
LYMPHOCYTES # BLD AUTO: 14.2 % — SIGNIFICANT CHANGE UP (ref 13–44)
MCHC RBC-ENTMCNC: 26.1 PG — LOW (ref 27–34)
MCHC RBC-ENTMCNC: 32.8 GM/DL — SIGNIFICANT CHANGE UP (ref 32–36)
MCV RBC AUTO: 79.4 FL — LOW (ref 80–100)
MONOCYTES # BLD AUTO: 0.64 K/UL — SIGNIFICANT CHANGE UP (ref 0–0.9)
MONOCYTES NFR BLD AUTO: 6.5 % — SIGNIFICANT CHANGE UP (ref 2–14)
NEUTROPHILS # BLD AUTO: 7.6 K/UL — HIGH (ref 1.8–7.4)
NEUTROPHILS NFR BLD AUTO: 77.3 % — HIGH (ref 43–77)
NITRITE UR-MCNC: NEGATIVE — SIGNIFICANT CHANGE UP
NRBC # BLD: 0 /100 WBCS — SIGNIFICANT CHANGE UP (ref 0–0)
PH UR: 6 — SIGNIFICANT CHANGE UP (ref 5–8)
PLATELET # BLD AUTO: 141 K/UL — LOW (ref 150–400)
POTASSIUM SERPL-MCNC: 4.2 MMOL/L — SIGNIFICANT CHANGE UP (ref 3.5–5.3)
POTASSIUM SERPL-MCNC: 4.5 MMOL/L — SIGNIFICANT CHANGE UP (ref 3.5–5.3)
POTASSIUM SERPL-SCNC: 4.2 MMOL/L — SIGNIFICANT CHANGE UP (ref 3.5–5.3)
POTASSIUM SERPL-SCNC: 4.5 MMOL/L — SIGNIFICANT CHANGE UP (ref 3.5–5.3)
PROT ?TM UR-MCNC: 6 MG/DL — SIGNIFICANT CHANGE UP (ref 0–12)
PROT SERPL-MCNC: 5.5 G/DL — LOW (ref 6–8.3)
PROT UR-MCNC: NEGATIVE MG/DL — SIGNIFICANT CHANGE UP
PROT/CREAT UR-RTO: 0.1 RATIO — SIGNIFICANT CHANGE UP (ref 0–0.2)
RBC # BLD: 3.3 M/UL — LOW (ref 3.8–5.2)
RBC # FLD: 17.3 % — HIGH (ref 10.3–14.5)
SODIUM SERPL-SCNC: 139 MMOL/L — SIGNIFICANT CHANGE UP (ref 135–145)
SODIUM SERPL-SCNC: 142 MMOL/L — SIGNIFICANT CHANGE UP (ref 135–145)
SODIUM UR-SCNC: 65 MMOL/L — SIGNIFICANT CHANGE UP
SP GR SPEC: <=1.005 — SIGNIFICANT CHANGE UP (ref 1–1.03)
SURGICAL PATHOLOGY STUDY: SIGNIFICANT CHANGE UP
URATE SERPL-MCNC: 7.5 MG/DL — HIGH (ref 2.5–7)
UROBILINOGEN FLD QL: 0.2 E.U./DL — SIGNIFICANT CHANGE UP
WBC # BLD: 9.84 K/UL — SIGNIFICANT CHANGE UP (ref 3.8–10.5)
WBC # FLD AUTO: 9.84 K/UL — SIGNIFICANT CHANGE UP (ref 3.8–10.5)

## 2020-06-24 PROCEDURE — 76775 US EXAM ABDO BACK WALL LIM: CPT | Mod: 26

## 2020-06-24 PROCEDURE — 99254 IP/OBS CNSLTJ NEW/EST MOD 60: CPT

## 2020-06-24 RX ADMIN — Medication 975 MILLIGRAM(S): at 11:08

## 2020-06-24 RX ADMIN — ENOXAPARIN SODIUM 40 MILLIGRAM(S): 100 INJECTION SUBCUTANEOUS at 11:09

## 2020-06-24 RX ADMIN — Medication 975 MILLIGRAM(S): at 05:30

## 2020-06-24 RX ADMIN — Medication 975 MILLIGRAM(S): at 17:00

## 2020-06-24 RX ADMIN — Medication 975 MILLIGRAM(S): at 23:35

## 2020-06-24 RX ADMIN — ENOXAPARIN SODIUM 40 MILLIGRAM(S): 100 INJECTION SUBCUTANEOUS at 23:35

## 2020-06-24 NOTE — PROGRESS NOTE ADULT - ASSESSMENT
A&P: 35y  s/p primary  section for arrest of dilation complicated by chorioamnionitis and preeclampsia with severe features. Patient is clinically stable and improving however now with persistently elevated Cr - POD3.   1. Preeclampsia: Patient is status post IV Magnesium for  24 hrs post delivery. Patient is currently normotensive. Holding NSAIDS given Cr, which is still significantly elevated. No other toxic complaints. Will obtain nephrology consult and possibly imaging to investigate potential etiologies for the elevated Cr. FeNa performed  was consistent with pre-renal etiology.   2. Chorioamnionitis: patient is afebrile; s/p antibiotics; no fundal tenderness   3. GI: Diet: Regular diet as tolerated   4. Neuro: Oral pain medications as needed: hold NSAIDs  5. :  patient is making adequate urine   6. History of VTE- continued lovenox 40 mg BID for 6 weeks postpartum   7. Continue routine postpartum care

## 2020-06-24 NOTE — PROGRESS NOTE ADULT - SUBJECTIVE AND OBJECTIVE BOX
Patient evaluated at bedside.   She reports incisional pain is well controlled. Patient's biggest complaint the swelling in her legs.   She denies headache, vision changes, dizziness, chest pain, palpitations, shortness of breathe, nausea, vomiting or heavy vaginal bleeding.  She has been ambulating without assistance, voiding spontaneously, passing gas, tolerating regular diet and is bottle feeding and breast pumping. No needs at this time.     Physical Exam:  Vital Signs Last 24 Hrs  T(C): 36.9 (24 Jun 2020 06:00), Max: 37.4 (23 Jun 2020 22:02)  T(F): 98.5 (24 Jun 2020 06:00), Max: 99.4 (23 Jun 2020 22:02)  HR: 68 (24 Jun 2020 06:00) (61 - 72)  BP: 134/80 (24 Jun 2020 06:00) (109/72 - 134/80)  BP(mean): 94 (23 Jun 2020 14:00) (91 - 94)  RR: 18 (24 Jun 2020 06:00) (18 - 18)  SpO2: 98% (24 Jun 2020 06:00) (96% - 100%)      GA: NAD, resting comfortably, burping the baby   Abd: + BS, soft, appropriately tender, obese, no rebound or guarding, uterus firm at midline   Incision: prevena in place   : lochia WNL  Extremities: significant edema that is nontender and symmetric                           8.5    15.90 )-----------( 119      ( 23 Jun 2020 05:41 )             25.7     06-24    139  |  106  |  19  ----------------------------<  69<L>  4.5   |  22  |  1.50<H>    Ca    8.4      24 Jun 2020 06:34  Mg     5.3     06-22    TPro  5.2<L>  /  Alb  2.6<L>  /  TBili  0.3  /  DBili  x   /  AST  27  /  ALT  20  /  AlkPhos  174<H>  06-23      PT/INR - ( 23 Jun 2020 05:41 )   PT: 10.3 sec;   INR: 0.91          PTT - ( 23 Jun 2020 05:41 )  PTT:30.7 sec

## 2020-06-24 NOTE — CONSULT NOTE ADULT - ASSESSMENT
Patient is a 35  y o Black female with pmh of obesity, unprovoked PE in  ( was on xarelto---> lovenox during pregnancy), chronic Hep B who was admitted for delivery.   Hospital course was complicated by postpartum preeclampsia and chorioamnionitis.   nephrology consult is placed in regards to preeclampsia      preeclampsia  s/p mad   s/p delivery  Bp readings reviewed-- for the most part ok  Labs noted including: CBC, CMP, haptoglobin, LDH   no indications for IVF  please, avoid NSAIDS  cr elevated likely 2nd to preeclampsia-- trending down slowly  will continue monitoring  goal bp < 140/90 Patient is a 35  y o Black female with pmh of obesity, unprovoked PE in  ( was on xarelto---> lovenox during pregnancy), chronic Hep B who was admitted for delivery.   Hospital course was complicated by postpartum preeclampsia and chorioamnionitis.   nephrology consult is placed in regards to preeclampsia      preeclampsia  s/p mad   s/p delivery  Bp readings reviewed-- for the most part ok  Labs noted including: CBC, CMP, haptoglobin, LDH, UA, urine lytes  no indications for IVF  please, avoid NSAIDS  cr elevated likely 2nd to preeclampsia-- trending down slowly  please, renal US  will continue monitoring  goal bp < 140/90

## 2020-06-24 NOTE — CONSULT NOTE ADULT - SUBJECTIVE AND OBJECTIVE BOX
REnal consult  Patient is a 35  y o Black female with pmh of obesity, unprovoked PE in  ( was on xarelto---> lovenox during pregnancy), chronic Hep B who was admitted for delivery.   Hospital course was complicated by postpartum preeclampsia and chorioamnionitis.   nephrology consult is placed in regards to preeclampsia  She is s/p mag drip  she did receive ampicillin and gentamycin   Patient denies any prior issue with BP until after labor  baseline cr around 0.8.   No FH of preeclampsia, nor lupus  regarding her DVT, she had a full work up--> unremarkable   She denies any headaches, blurry vision, SOB.  SHe reports LE edema  CBC, CMP, uric acid, LDH noted  UA, urine na, cr,   Uprcr noted 0.1      PAST MEDICAL & SURGICAL HISTORY:      Allergies    No Known Allergies    Intolerances        FAMILY HISTORY: no smoking       SOCIAL HISTORY: lives at home    MEDICATIONS  (STANDING):  acetaminophen   Tablet .. 975 milliGRAM(s) Oral <User Schedule>  diphtheria/tetanus/pertussis (acellular) Vaccine (ADAcel) 0.5 milliLiter(s) IntraMuscular once  enoxaparin Injectable 40 milliGRAM(s) SubCutaneous every 12 hours  lactated ringers. 1000 milliLiter(s) (100 mL/Hr) IV Continuous <Continuous>  morphine PF Epidural 3 milliGRAM(s) Epidural once  oxytocin Infusion 333.333 milliUNIT(s)/Min (1000 mL/Hr) IV Continuous <Continuous>    MEDICATIONS  (PRN):  diphenhydrAMINE 25 milliGRAM(s) Oral every 6 hours PRN Itching  lanolin Ointment 1 Application(s) Topical every 6 hours PRN Sore Nipples  magnesium hydroxide Suspension 30 milliLiter(s) Oral two times a day PRN Constipation  naloxone Injectable 0.1 milliGRAM(s) IV Push every 3 minutes PRN For ANY of the following changes in patient status:  A. RR LESS THAN 10 breaths per minute, B. Oxygen saturation LESS THAN 90%, C. Sedation score of 6  ondansetron Injectable 4 milliGRAM(s) IV Push every 6 hours PRN Nausea  oxyCODONE    IR 5 milliGRAM(s) Oral every 3 hours PRN Moderate to Severe Pain (4-10)  oxyCODONE    IR 5 milliGRAM(s) Oral once PRN Moderate to Severe Pain (4-10)  simethicone 80 milliGRAM(s) Chew every 4 hours PRN Gas      REVIEW OF SYSTEMS:    CONSTITUTIONAL: No fever or chills, No fatigue or tiredness.  EYES: No blurred or double vision.  ENT: No recent URI or sore throat  RESPIRATORY: No shortness of breath, cough, hemoptysis  CARDIOVASCULAR: No Chest pain or shortness of breath  GASTROINTESTINAL: NO abdominal or flank pain, No nausea or vomiting, No diarrhea  GENITOURINARY: No dysuria or urinary burning, No difficulty passing urine, No hematuria  NEUROLOGICAL: No headaches or blurred vision  SKIN: No skin rashes   MUSCULOSKELETAL: LE edema         PHYSICAL EXAM: T 98.1, HR 71, Bp 136/72, RR 18, osat 99 RA   GENERAL: NAD, obese  HEAD:  Atraumatic, Normocephalic,   EYES: Bilateral conjunctiva and sclera normal,  Oral cavity: Oral mucosa dry and pink  NECK: Neck supple, No JVD  CHEST/LUNG: Clear to auscultation bilaterally; No wheeze, no rales, no crepitations  HEART: Regular rate and rhythm. LASHANDA II/VI at LPSB, No gallop, no rub   ABDOMEN: Soft, Nontender, BS+nt, No flank tenderness.   EXTREMITIES: +2 pitting edema in BLT lE  Neurology: AAOx3, no focal neurological deficit  SKIN: No rashes or lesions  - [place] cath    CAPILLARY BLOOD GLUCOSE          I&O's Summary        LABS:                            8.6    9.84  )-----------( 141      ( 2020 11:56 )             26.2       PT/INR - ( 2020 05:41 )   PT: 10.3 sec;   INR: 0.91          PTT - ( 2020 05:41 )  PTT:30.7 sec      Urinalysis Basic - ( 2020 12:42 )    Color: Yellow / Appearance: Clear / SG: <=1.005 / pH: x  Gluc: x / Ketone: NEGATIVE  / Bili: Negative / Urobili: 0.2 E.U./dL   Blood: x / Protein: NEGATIVE mg/dL / Nitrite: NEGATIVE   Leuk Esterase: NEGATIVE / RBC: 5-10 /HPF / WBC < 5 /HPF   Sq Epi: x / Non Sq Epi: 0-5 /HPF / Bacteria: Present /HPF        RADIOLOGY & ADDITIONAL TESTS:    EKG/Telemetry: Reviewed

## 2020-06-24 NOTE — CONSULT NOTE ADULT - ATTENDING COMMENTS
female with h/o  unprovoked dvt/pe on AC (per pt sought multiple specialists at the time, and coagulopathy w/u negative), hep B w/o viral load (treatment with entacavir paused during pregnancy) with severe PEC, bp well controlled w/o medications last few days, however Cr ollie to 1.5 post op from baseline 0.8-0.9. Hb and Plt dropping. Check LDH, hapto, peripheral smear for hemolysis. Repeat BMP and add on liver enzymes, uric acid.   Check renal sono to r/o anatomic pathology, however renal injury most likely 2/2 PEC  Check urine analysis, urine protein to creatinine ratio, urine sodium  Repeat CMP and CBC in am again to trend

## 2020-06-25 ENCOUNTER — TRANSCRIPTION ENCOUNTER (OUTPATIENT)
Age: 35
End: 2020-06-25

## 2020-06-25 VITALS
DIASTOLIC BLOOD PRESSURE: 82 MMHG | RESPIRATION RATE: 19 BRPM | TEMPERATURE: 98 F | OXYGEN SATURATION: 99 % | HEART RATE: 66 BPM | SYSTOLIC BLOOD PRESSURE: 131 MMHG

## 2020-06-25 LAB
ALBUMIN SERPL ELPH-MCNC: 2.7 G/DL — LOW (ref 3.3–5)
ALP SERPL-CCNC: 132 U/L — HIGH (ref 40–120)
ALT FLD-CCNC: 26 U/L — SIGNIFICANT CHANGE UP (ref 10–45)
ANION GAP SERPL CALC-SCNC: 11 MMOL/L — SIGNIFICANT CHANGE UP (ref 5–17)
AST SERPL-CCNC: 28 U/L — SIGNIFICANT CHANGE UP (ref 10–40)
BASOPHILS # BLD AUTO: 0.02 K/UL — SIGNIFICANT CHANGE UP (ref 0–0.2)
BASOPHILS NFR BLD AUTO: 0.3 % — SIGNIFICANT CHANGE UP (ref 0–2)
BILIRUB SERPL-MCNC: 0.3 MG/DL — SIGNIFICANT CHANGE UP (ref 0.2–1.2)
BUN SERPL-MCNC: 16 MG/DL — SIGNIFICANT CHANGE UP (ref 7–23)
CALCIUM SERPL-MCNC: 8.4 MG/DL — SIGNIFICANT CHANGE UP (ref 8.4–10.5)
CHLORIDE SERPL-SCNC: 108 MMOL/L — SIGNIFICANT CHANGE UP (ref 96–108)
CO2 SERPL-SCNC: 23 MMOL/L — SIGNIFICANT CHANGE UP (ref 22–31)
CREAT SERPL-MCNC: 1.32 MG/DL — HIGH (ref 0.5–1.3)
EOSINOPHIL # BLD AUTO: 0.17 K/UL — SIGNIFICANT CHANGE UP (ref 0–0.5)
EOSINOPHIL NFR BLD AUTO: 2.2 % — SIGNIFICANT CHANGE UP (ref 0–6)
GLUCOSE SERPL-MCNC: 67 MG/DL — LOW (ref 70–99)
HCT VFR BLD CALC: 27.1 % — LOW (ref 34.5–45)
HGB BLD-MCNC: 8.6 G/DL — LOW (ref 11.5–15.5)
IMM GRANULOCYTES NFR BLD AUTO: 0.6 % — SIGNIFICANT CHANGE UP (ref 0–1.5)
LYMPHOCYTES # BLD AUTO: 2 K/UL — SIGNIFICANT CHANGE UP (ref 1–3.3)
LYMPHOCYTES # BLD AUTO: 25.4 % — SIGNIFICANT CHANGE UP (ref 13–44)
MCHC RBC-ENTMCNC: 25.8 PG — LOW (ref 27–34)
MCHC RBC-ENTMCNC: 31.7 GM/DL — LOW (ref 32–36)
MCV RBC AUTO: 81.4 FL — SIGNIFICANT CHANGE UP (ref 80–100)
MONOCYTES # BLD AUTO: 0.67 K/UL — SIGNIFICANT CHANGE UP (ref 0–0.9)
MONOCYTES NFR BLD AUTO: 8.5 % — SIGNIFICANT CHANGE UP (ref 2–14)
NEUTROPHILS # BLD AUTO: 4.95 K/UL — SIGNIFICANT CHANGE UP (ref 1.8–7.4)
NEUTROPHILS NFR BLD AUTO: 63 % — SIGNIFICANT CHANGE UP (ref 43–77)
NRBC # BLD: 0 /100 WBCS — SIGNIFICANT CHANGE UP (ref 0–0)
PLATELET # BLD AUTO: 135 K/UL — LOW (ref 150–400)
POTASSIUM SERPL-MCNC: 4.9 MMOL/L — SIGNIFICANT CHANGE UP (ref 3.5–5.3)
POTASSIUM SERPL-SCNC: 4.9 MMOL/L — SIGNIFICANT CHANGE UP (ref 3.5–5.3)
PROT SERPL-MCNC: 5.4 G/DL — LOW (ref 6–8.3)
RBC # BLD: 3.33 M/UL — LOW (ref 3.8–5.2)
RBC # FLD: 17.3 % — HIGH (ref 10.3–14.5)
SODIUM SERPL-SCNC: 142 MMOL/L — SIGNIFICANT CHANGE UP (ref 135–145)
WBC # BLD: 7.86 K/UL — SIGNIFICANT CHANGE UP (ref 3.8–10.5)
WBC # FLD AUTO: 7.86 K/UL — SIGNIFICANT CHANGE UP (ref 3.8–10.5)

## 2020-06-25 PROCEDURE — P9016: CPT

## 2020-06-25 PROCEDURE — 80053 COMPREHEN METABOLIC PANEL: CPT

## 2020-06-25 PROCEDURE — 86923 COMPATIBILITY TEST ELECTRIC: CPT

## 2020-06-25 PROCEDURE — 81001 URINALYSIS AUTO W/SCOPE: CPT

## 2020-06-25 PROCEDURE — 83735 ASSAY OF MAGNESIUM: CPT

## 2020-06-25 PROCEDURE — 83010 ASSAY OF HAPTOGLOBIN QUANT: CPT

## 2020-06-25 PROCEDURE — 85730 THROMBOPLASTIN TIME PARTIAL: CPT

## 2020-06-25 PROCEDURE — 84156 ASSAY OF PROTEIN URINE: CPT

## 2020-06-25 PROCEDURE — 86901 BLOOD TYPING SEROLOGIC RH(D): CPT

## 2020-06-25 PROCEDURE — 85027 COMPLETE CBC AUTOMATED: CPT

## 2020-06-25 PROCEDURE — 83615 LACTATE (LD) (LDH) ENZYME: CPT

## 2020-06-25 PROCEDURE — 99214 OFFICE O/P EST MOD 30 MIN: CPT

## 2020-06-25 PROCEDURE — 80048 BASIC METABOLIC PNL TOTAL CA: CPT

## 2020-06-25 PROCEDURE — 85025 COMPLETE CBC W/AUTO DIFF WBC: CPT

## 2020-06-25 PROCEDURE — 86780 TREPONEMA PALLIDUM: CPT

## 2020-06-25 PROCEDURE — 84300 ASSAY OF URINE SODIUM: CPT

## 2020-06-25 PROCEDURE — 99233 SBSQ HOSP IP/OBS HIGH 50: CPT

## 2020-06-25 PROCEDURE — 88307 TISSUE EXAM BY PATHOLOGIST: CPT

## 2020-06-25 PROCEDURE — 76775 US EXAM ABDO BACK WALL LIM: CPT

## 2020-06-25 PROCEDURE — 87635 SARS-COV-2 COVID-19 AMP PRB: CPT

## 2020-06-25 PROCEDURE — 85610 PROTHROMBIN TIME: CPT

## 2020-06-25 PROCEDURE — 86850 RBC ANTIBODY SCREEN: CPT

## 2020-06-25 PROCEDURE — 85384 FIBRINOGEN ACTIVITY: CPT

## 2020-06-25 PROCEDURE — 84112 EVAL AMNIOTIC FLUID PROTEIN: CPT

## 2020-06-25 PROCEDURE — 82570 ASSAY OF URINE CREATININE: CPT

## 2020-06-25 PROCEDURE — 36415 COLL VENOUS BLD VENIPUNCTURE: CPT

## 2020-06-25 PROCEDURE — 84550 ASSAY OF BLOOD/URIC ACID: CPT

## 2020-06-25 PROCEDURE — 36430 TRANSFUSION BLD/BLD COMPNT: CPT

## 2020-06-25 RX ADMIN — Medication 975 MILLIGRAM(S): at 05:29

## 2020-06-25 RX ADMIN — ENOXAPARIN SODIUM 40 MILLIGRAM(S): 100 INJECTION SUBCUTANEOUS at 11:50

## 2020-06-25 RX ADMIN — Medication 975 MILLIGRAM(S): at 11:50

## 2020-06-25 RX ADMIN — SIMETHICONE 80 MILLIGRAM(S): 80 TABLET, CHEWABLE ORAL at 11:50

## 2020-06-25 NOTE — PROGRESS NOTE ADULT - ATTENDING COMMENTS
severe PEC with GOLD, no proteinuria, mild LFT elevated and mild thrombocytopenia, BPs controlled w/o medications, at goal last 24hrs with improving renal fx, normalized LFTs. Plt mildly low still, Hb stable, no evidence of hemolysis. Normal renal sonogram. GOLD likely related to PEC. Gentamycin causes nephrotoxicity but not with only one dose, also possible it was ampicillin related AIN however PEC-related is the most likely.   To check her BPs at home TID and record values. To call if Bps reaching >140/90 consistently so we can repeat PEC labs and reassess.   Will arrange a video f/u visit with me on Thurs July2nd at 1pm  No nsaids.

## 2020-06-25 NOTE — DISCHARGE NOTE OB - ENOXAPARIN/LOVENOX EDUCATION
Enoxaparin/Lovenox/ – Compliance/Enoxaparin/Lovenox/ – Dietary Advice/Enoxaparin/Lovenox – Follow up Monitoring/Enoxaparin/Lovenox – Potential for Adverse Drug Reaction and Interactions

## 2020-06-25 NOTE — PROGRESS NOTE ADULT - SUBJECTIVE AND OBJECTIVE BOX
Patient is a 35y Female seen and evaluated at bedside.   pt seen and examined  she denies any headaches, blurry vision, RUQ abd pain  labs noted  Cr slightly down @ 1.34  renal US noted  BP checked 119/73, HR 75    acetaminophen   Tablet .. 975 <User Schedule>  diphenhydrAMINE 25 every 6 hours PRN  diphtheria/tetanus/pertussis (acellular) Vaccine (ADAcel) 0.5 once  enoxaparin Injectable 40 every 12 hours  lactated ringers. 1000 <Continuous>  lanolin Ointment 1 every 6 hours PRN  magnesium hydroxide Suspension 30 two times a day PRN  morphine PF Epidural 3 once  naloxone Injectable 0.1 every 3 minutes PRN  ondansetron Injectable 4 every 6 hours PRN  oxyCODONE    IR 5 every 3 hours PRN  oxyCODONE    IR 5 once PRN  oxytocin Infusion 333.333 <Continuous>  simethicone 80 every 4 hours PRN      Allergies    No Known Allergies    Intolerances        T(C): , Max: 37.3 (06-24-20 @ 22:05)  T(F): , Max: 99.1 (06-24-20 @ 22:05)  HR: 54 (06-25-20 @ 09:00)  BP: 143/81 (06-25-20 @ 09:00)  BP(mean): 92 (06-24-20 @ 14:00)  RR: 18 (06-25-20 @ 09:00)  SpO2: 99% (06-25-20 @ 09:00)  Wt(kg): --        Review of Systems:  CONSTITUTIONAL: No fever or chills, No fatigue or tiredness.  EYES: No blurred or double vision.  RESPIRATORY: No shortness of breath, cough, hemoptysis  CARDIOVASCULAR: No Chest pain or shortness of breath  GASTROINTESTINAL: NO abdominal or flank pain, No nausea or vomiting, No diarrhea  GENITOURINARY: No dysuria or urinary burning, No difficulty passing urine, No hematuria  NEUROLOGICAL: No headaches or blurred vision  SKIN: No skin rashes   MUSCULOSKELETAL: No arthralgia, Joint pain, leg edema, No muscle pains      PHYSICAL EXAM:  GENERAL: NAD, obese  HEAD:  Atraumatic, Normocephalic,   EYES: Bilateral conjunctiva and sclera normal,  Oral cavity: Oral mucosa dry and pink  NECK: Neck supple, No JVD  CHEST/LUNG: Clear to auscultation bilaterally; No wheeze, no rales, no crepitations  HEART: Regular rate and rhythm. LASHANDA II/VI at LPSB, No gallop, no rub   ABDOMEN: Soft, Nontender, BS+nt, No flank tenderness.   EXTREMITIES: +2 pitting edema in BLT lE  Neurology: AAOx3, no focal neurological deficit  SKIN: No rashes or lesions        LABS:                        8.6    7.86  )-----------( 135      ( 25 Jun 2020 06:23 )             27.1     06-25    142  |  108  |  16  ----------------------------<  67<L>  4.9   |  23  |  1.32<H>    Ca    8.4      25 Jun 2020 06:23    TPro  5.4<L>  /  Alb  2.7<L>  /  TBili  0.3  /  DBili  x   /  AST  28  /  ALT  26  /  AlkPhos  132<H>  06-25        Urinalysis Basic - ( 24 Jun 2020 12:42 )    Color: Yellow / Appearance: Clear / SG: <=1.005 / pH: x  Gluc: x / Ketone: NEGATIVE  / Bili: Negative / Urobili: 0.2 E.U./dL   Blood: x / Protein: NEGATIVE mg/dL / Nitrite: NEGATIVE   Leuk Esterase: NEGATIVE / RBC: 5-10 /HPF / WBC < 5 /HPF   Sq Epi: x / Non Sq Epi: 0-5 /HPF / Bacteria: Present /HPF      Sodium, Random Urine: 65 mmol/L (06-24 @ 12:42)  Creatinine, Random Urine: 49 mg/dL (06-24 @ 12:42)  Protein/Creatinine Ratio Calculation: 0.1 Ratio (06-24 @ 12:42)        RADIOLOGY & ADDITIONAL STUDIES:

## 2020-06-25 NOTE — PROGRESS NOTE ADULT - ASSESSMENT
Patient is a 35  y o Black female with pmh of obesity, unprovoked PE in  ( was on xarelto---> lovenox during pregnancy), chronic Hep B who was admitted for delivery.   Hospital course was complicated by postpartum preeclampsia and chorioamnionitis.   nephrology consult is placed in regards to preeclampsia      preeclampsia  s/p mad   s/p delivery  renal US reviewed  Bp readings reviewed-- for the most part ok  no need to start anti htn regimen   Labs noted --cr slightly down ==> rise likely 2nd to preeclampsia  ok to discharge pt from renal standpoint  She should check her BP 3x daily and notify her OB and Dr. Clark, nephrologist if BP consistently above 140s/90s  goal BP < 140/90  She should have BMP checked within a week  She should follow with Dr Clark within 2 ribas-[phone]

## 2020-06-25 NOTE — PROGRESS NOTE ADULT - ASSESSMENT
A&P: 35y  s/p primary  section for arrest of dilation complicated by chorioamnionitis and preeclampsia with severe features. Patient is clinically stable and has met postoperative milestones appropriately. The patients Cr remains elevated but is downtrending. - POD4   1. Preeclampsia: Patient is status post IV Magnesium for  24 hrs post delivery. Patient is currently normotensive. Holding NSAIDS given Cr, which is still elevated. No other toxic complaints. No acute findings on renal sonogram. Appreciate recommendations from nephrology.   2. Chorioamnionitis: patient is afebrile; s/p antibiotics; no fundal tenderness   3. GI: Diet: Regular diet as tolerated   4. Neuro: Oral pain medications as needed: hold NSAIDs  5. :  patient is making adequate urine   6. History of VTE- continued lovenox 40 mg BID for 6 weeks postpartum   7. Continue routine postpartum care     Dispo: possibly today

## 2020-06-25 NOTE — PROGRESS NOTE ADULT - SUBJECTIVE AND OBJECTIVE BOX
Patient evaluated at bedside.   She reports pain is well controlled with Tylenol.  She states she would like to go home.  She denies headache, dizziness, chest pain, palpitations, shortness of breath, nausea, vomiting or heavy vaginal bleeding.  She has been ambulating without assistance, voiding spontaneously, passing gas, tolerating regular diet and is breastfeeding.    PHYSICAL EXAM:    GA: NAD, A+0 x 3  CV: RRR  Pulm: CTA BL  Breasts: soft, nontender, no palpable masses  Abd: ( + ) BS, soft, nontender, nondistended, no rebound or guarding,   Incision: clean, dry and intact; steri-strips in place  Uterus: Fundus midline; firm at 3 fb below umbilicus  : lochia WNL  Extremities: 3+swelling or calf tenderness, reflexes +2 bilaterally                            8.6    7.86  )-----------( 135      ( 2020 06:23 )             27.1     06    142  |  108  |  16  ----------------------------<  67<L>  4.9   |  23  |  1.32<H>    Ca    8.4      2020 06:23    TPro  5.4<L>  /  Alb  2.7<L>  /  TBili  0.3  /  DBili  x   /  AST  28  /  ALT  26  /  AlkPhos  132<H>        Renal consult appreciated.  Renal US benign findings.      Assessment:    POD # 4 s/p  delivery  PEC w/ severe features  S/P magnesium prophylaxis       Plan:    Diet: continue regular diet as tolerated  Labs: Reviewed and are trending down (more towards nml).  Pain meds: continue oral pain meds PRN  Activity: OOB ad rell  Disposition: Home today if cleared by Renal.

## 2020-06-25 NOTE — DISCHARGE NOTE OB - PLAN OF CARE
Feel well! Take Motrin 600mg every 6 hours and/or tylenol 650mg every 6 hours as needed for pain. Call your OB to schedule a follow up appointment on Tuesday. Nothing per vagina until cleared by your OB - no intercourse, douching, tampons, etc.  Call your OB if you experience severe abdominal pain not improved by oral pain medications, heavy bright red vaginal bleeding saturating more than 1 pad per hour, or fever greater than 100.4F. Consider contraception options to be discussed with your OB. No fevers No further antibiotic treatment needed. No bleeding Inform your doctor for any abnormal bleeding. Normal blood pressures and no symptoms # Monitor blood pressure three times a day. Please document the blood pressure values to review with obstetrician at follow-up appointment.   # If your blood pressure is equal to or greater than 160/110 (either one) OR if you experience any of the following symptoms: changes in vision, headache not relieved with Tylenol, severe abdominal pain, vomiting, increased vaginal bleeding, chest pain or shortness of breath, please return to the hospital.  # If your blood pressure is equal to or greater than 150/100 (either one), please call your obstetrician.  # Hold parameters: If the blood pressure is lower than 110/60 (either one) skip that dose of medication and inform your doctor.  # Please schedule an appointment at your physician office on [place].  # Please schedule an appointment with Dr. Clark (Nephrology) according to their recommendations.

## 2020-06-25 NOTE — DISCHARGE NOTE OB - PATIENT PORTAL LINK FT
You can access the FollowMyHealth Patient Portal offered by [facility] by registering at the following website: [email or web address]. By joining [employer]’s FollowMyHealth portal, you will also be able to view your health information using other applications (apps) compatible with our system.

## 2020-06-25 NOTE — DISCHARGE NOTE OB - CARE PROVIDER_API CALL
MARY OWENS  OBSTETRICS AND GYNECOLOGY  [address] [address]  Phone: [phone]  Fax: [phone]  Follow Up Time:     Bernie Clark)  Nephrology  [address] [place] [address]  Phone: [phone]  Fax: [phone]  Follow Up Time:

## 2020-06-25 NOTE — DISCHARGE NOTE OB - CARE PLAN
Principal Discharge DX:	 delivery delivered  Goal:	Feel well!  Assessment and plan of treatment:	Take Motrin 600mg every 6 hours and/or tylenol 650mg every 6 hours as needed for pain. Call your OB to schedule a follow up appointment on Tuesday. Nothing per vagina until cleared by your OB - no intercourse, douching, tampons, etc.  Call your OB if you experience severe abdominal pain not improved by oral pain medications, heavy bright red vaginal bleeding saturating more than 1 pad per hour, or fever greater than 100.4F. Consider contraception options to be discussed with your OB.  Secondary Diagnosis:	Chorioamnionitis  Goal:	No fevers  Assessment and plan of treatment:	No further antibiotic treatment needed.  Secondary Diagnosis:	Postpartum hemorrhage  Goal:	No bleeding  Assessment and plan of treatment:	Inform your doctor for any abnormal bleeding.  Secondary Diagnosis:	Preeclampsia, severe  Goal:	Normal blood pressures and no symptoms  Assessment and plan of treatment:	# Monitor blood pressure three times a day. Please document the blood pressure values to review with obstetrician at follow-up appointment.   # If your blood pressure is equal to or greater than 160/110 (either one) OR if you experience any of the following symptoms: changes in vision, headache not relieved with Tylenol, severe abdominal pain, vomiting, increased vaginal bleeding, chest pain or shortness of breath, please return to the hospital.  # If your blood pressure is equal to or greater than 150/100 (either one), please call your obstetrician.  # Hold parameters: If the blood pressure is lower than 110/60 (either one) skip that dose of medication and inform your doctor.  # Please schedule an appointment at your physician office on .  # Please schedule an appointment with Dr. Clark (Nephrology) according to their recommendations.

## 2020-06-25 NOTE — PROGRESS NOTE ADULT - SUBJECTIVE AND OBJECTIVE BOX
Delayed entry secondary to patient care:     Patient evaluated at bedside.   She reports pain is well controlled.  She denies headache, dizziness, chest pain, palpitations, shortness of breathe, nausea, vomiting or heavy vaginal bleeding.  She has been ambulating without assistance, voiding spontaneously, passing gas, tolerating regular diet and is breast and bottle feeding. Patient is very hopeful to go home.     Physical Exam:  Vital Signs Last 24 Hrs  T(C): 36.7 (25 Jun 2020 09:00), Max: 37.3 (24 Jun 2020 22:05)  T(F): 98.1 (25 Jun 2020 09:00), Max: 99.1 (24 Jun 2020 22:05)  HR: 54 (25 Jun 2020 09:00) (54 - 75)  BP: 143/81 (25 Jun 2020 09:00) (119/78 - 143/81)  BP(mean): 92 (24 Jun 2020 14:00) (92 - 92)  RR: 18 (25 Jun 2020 09:00) (18 - 20)  SpO2: 99% (25 Jun 2020 09:00) (99% - 100%)      GA: NAD, resting comfortably   Abd: + BS, soft, minimally tender, obese, no rebound or guarding, uterus firm at midline,  fb below umbilicus  Incision: prevena in place   : lochia WNL  Extremities: 2+ symmetric edema, nontender                             8.6    7.86  )-----------( 135      ( 25 Jun 2020 06:23 )             27.1     06-25    142  |  108  |  16  ----------------------------<  67<L>  4.9   |  23  |  1.32<H>    Ca    8.4      25 Jun 2020 06:23    TPro  5.4<L>  /  Alb  2.7<L>  /  TBili  0.3  /  DBili  x   /  AST  28  /  ALT  26  /  AlkPhos  132<H>  06-25

## 2020-06-25 NOTE — DISCHARGE NOTE OB - MEDICATION SUMMARY - MEDICATIONS TO TAKE
I will START or STAY ON the medications listed below when I get home from the hospital:    enoxaparin 40 mg/0.4 mL injectable solution  -- 40 milligram(s) subcutaneously 2 times a day   -- It is very important that you take or use this exactly as directed.  Do not skip doses or discontinue unless directed by your doctor.    -- Indication: For VTE/PE

## 2020-06-25 NOTE — DISCHARGE NOTE OB - HOSPITAL COURSE
Pt had an uncomplicated c/s delivery. Her postpartum course was complicated by preeclampsia with severe features. Her vital signs were monitored closely and she was treated with Magnesium for 24 hours with good response. Her blood pressures are stable with no additional medications. Her creatinine was elevated during her admission due to her PEC state and prerenal state. Nephrology team was consulted and will follow up after discharge.    Her labor course was complicated with Chorioamnionitis and she was treated with ampicillin, Gentamycin and Clindamycin. She also had postpartum hemorrhage and was treated with TXA and 2 units of RBCs.   Prior to discharge the Pt is feeling well, lab results are stable, creatinine is trending down and vital signs are stable for discharge.

## 2020-06-25 NOTE — DISCHARGE NOTE OB - CARE PROVIDERS DIRECT ADDRESSES
[email or web address]

## 2020-06-30 ENCOUNTER — APPOINTMENT (OUTPATIENT)
Dept: OBGYN | Facility: CLINIC | Age: 35
End: 2020-06-30
Payer: COMMERCIAL

## 2020-06-30 VITALS — HEIGHT: 68 IN | DIASTOLIC BLOOD PRESSURE: 80 MMHG | SYSTOLIC BLOOD PRESSURE: 130 MMHG

## 2020-06-30 PROCEDURE — 0503F POSTPARTUM CARE VISIT: CPT

## 2020-06-30 NOTE — HISTORY OF PRESENT ILLNESS
[Postpartum Follow Up] : postpartum follow up [Female] : Delivery History: baby girl [Delivery Date: ___] : on [unfilled] [Breastfeeding] : currently nursing [Primary C/S] : delivered by  section [None] : No associated symptoms are reported [Clean/Dry/Intact] : clean, dry and intact [Erythema] : not erythematous [Swelling] : not swollen [No Sign of Infection] : is showing no signs of infection [Dehiscence] : not dehisced [Doing Well] : is doing well [Excellent Pain Control] : has excellent pain control [FreeTextEntry8] : Follow up incision check. [de-identified] : wound vac removed  [de-identified] : c/w lovenox as directed, rto 1 month

## 2020-07-01 ENCOUNTER — TRANSCRIPTION ENCOUNTER (OUTPATIENT)
Age: 35
End: 2020-07-01

## 2020-07-02 ENCOUNTER — APPOINTMENT (OUTPATIENT)
Dept: NEPHROLOGY | Facility: CLINIC | Age: 35
End: 2020-07-02
Payer: COMMERCIAL

## 2020-07-02 DIAGNOSIS — R60.0 LOCALIZED EDEMA: ICD-10-CM

## 2020-07-02 DIAGNOSIS — O09.519 SUPERVISION OF ELDERLY PRIMIGRAVIDA, UNSPECIFIED TRIMESTER: ICD-10-CM

## 2020-07-02 DIAGNOSIS — E66.9 OBESITY, UNSPECIFIED: ICD-10-CM

## 2020-07-02 PROCEDURE — 99244 OFF/OP CNSLTJ NEW/EST MOD 40: CPT

## 2020-07-06 DIAGNOSIS — R60.0 LOCALIZED EDEMA: ICD-10-CM

## 2020-07-06 DIAGNOSIS — O99.89 OTHER SPECIFIED DISEASES AND CONDITIONS COMPLICATING PREGNANCY, CHILDBIRTH AND THE PUERPERIUM: ICD-10-CM

## 2020-07-06 DIAGNOSIS — O41.1230 CHORIOAMNIONITIS, THIRD TRIMESTER, NOT APPLICABLE OR UNSPECIFIED: ICD-10-CM

## 2020-07-06 DIAGNOSIS — E66.01 MORBID (SEVERE) OBESITY DUE TO EXCESS CALORIES: ICD-10-CM

## 2020-07-06 DIAGNOSIS — D57.3 SICKLE-CELL TRAIT: ICD-10-CM

## 2020-07-06 DIAGNOSIS — Z86.711 PERSONAL HISTORY OF PULMONARY EMBOLISM: ICD-10-CM

## 2020-07-06 DIAGNOSIS — Z86.718 PERSONAL HISTORY OF OTHER VENOUS THROMBOSIS AND EMBOLISM: ICD-10-CM

## 2020-07-06 DIAGNOSIS — N17.9 ACUTE KIDNEY FAILURE, UNSPECIFIED: ICD-10-CM

## 2020-07-06 DIAGNOSIS — B18.1 CHRONIC VIRAL HEPATITIS B WITHOUT DELTA-AGENT: ICD-10-CM

## 2020-07-06 DIAGNOSIS — Z3A.40 40 WEEKS GESTATION OF PREGNANCY: ICD-10-CM

## 2020-07-06 DIAGNOSIS — O42.90 PREMATURE RUPTURE OF MEMBRANES, UNSPECIFIED AS TO LENGTH OF TIME BETWEEN RUPTURE AND ONSET OF LABOR, UNSPECIFIED WEEKS OF GESTATION: ICD-10-CM

## 2020-07-06 DIAGNOSIS — Z79.01 LONG TERM (CURRENT) USE OF ANTICOAGULANTS: ICD-10-CM

## 2020-07-13 NOTE — HISTORY OF PRESENT ILLNESS
[FreeTextEntry1] : 36yo obese black F  with h/o chronic Hep B (tx paused during pregnancy, no viral load) and h/o DVT/PE on anticoagulation (per pt extensive AC w/u negative in past) who was seen in hospital s/p Csection at 40 weeks gestation on 20 c/b severe PEC (GOLD Cr up to 1.5, no proteinuria, mildly elevated LFT and mild thrombocytopenia, BPs controlled w/o medications) here to establish care:\par \par OB Dr Venice Lozano\par \par Also had chorioamnionitis treated w abx. Feeling well, no fevers, mild incisional pain at surgery site. No headaches, dizziness, SOB. LE edema resolved. \par Checking BPs at home irregularly - "normal" per pt. She saw Dr Lozano for f/u and her sbp was 120 during visit (doesn't remember diastolic but was told her bp 'looked good')\par \par

## 2020-07-13 NOTE — CONSULT LETTER
[Dear  ___] : Dear  [unfilled], [Consult Letter:] : I had the pleasure of evaluating your patient, [unfilled]. [Please see my note below.] : Please see my note below. [Consult Closing:] : Thank you very much for allowing me to participate in the care of this patient.  If you have any questions, please do not hesitate to contact me. [Sincerely,] : Sincerely, [FreeTextEntry3] : Bernie Clark MD\par  of Medicine\par Division of Kidney Diseases and Hypertension\par [facility] \par [employer] [facility] Tel: [phone]\par Email: [email or web address] \par

## 2020-07-13 NOTE — ASSESSMENT
[FreeTextEntry1] : 34yo obese black F  with h/o chronic Hep B (tx paused during pregnancy, no viral load) and h/o DVT/PE on anticoagulation (per pt extensive AC w/u negative in past) who was seen in hospital s/p Csection at 40 weeks gestation on 20 c/b severe PEC (GOLD Cr up to 1.5, no proteinuria, mildly elevated LFT and mild thrombocytopenia, BPs controlled w/o medications) here to establish care:\par \par PEC - improved, recheck labs as had gold\par BP fair control w/o meds\par Euvolemic, no signs of infection\par GOLD thought to be 2/2 PEC (no nephrotoxic drugs given to coincide with rise in Cr, renal sono unremarkable)\par \par f/u in 6 weeks\par \par Addendum: reviewed labs - Cr down to 1.1, PCR down to 0.3 (in the setting of hematuria so likely a contaminant)

## 2020-07-29 ENCOUNTER — APPOINTMENT (OUTPATIENT)
Dept: OBGYN | Facility: CLINIC | Age: 35
End: 2020-07-29
Payer: COMMERCIAL

## 2020-07-29 VITALS
SYSTOLIC BLOOD PRESSURE: 100 MMHG | WEIGHT: 293 LBS | BODY MASS INDEX: 44.41 KG/M2 | DIASTOLIC BLOOD PRESSURE: 60 MMHG | HEIGHT: 68 IN

## 2020-07-29 PROCEDURE — 0503F POSTPARTUM CARE VISIT: CPT

## 2020-07-29 RX ORDER — BLOOD PRESSURE TEST KIT-LARGE
KIT MISCELLANEOUS
Qty: 1 | Refills: 0 | Status: ACTIVE | COMMUNITY
Start: 2020-07-29 | End: 1900-01-01

## 2020-08-24 ENCOUNTER — APPOINTMENT (OUTPATIENT)
Dept: NEPHROLOGY | Facility: CLINIC | Age: 35
End: 2020-08-24
Payer: COMMERCIAL

## 2020-08-24 DIAGNOSIS — I26.99 OTHER PULMONARY EMBOLISM W/OUT ACUTE COR PULMONALE: ICD-10-CM

## 2020-08-24 DIAGNOSIS — N17.9 ACUTE KIDNEY FAILURE, UNSPECIFIED: ICD-10-CM

## 2020-08-24 DIAGNOSIS — B18.1 CHRONIC VIRAL HEPATITIS B W/OUT DELTA-AGENT: ICD-10-CM

## 2020-08-24 DIAGNOSIS — I10 ESSENTIAL (PRIMARY) HYPERTENSION: ICD-10-CM

## 2020-08-24 PROCEDURE — 99214 OFFICE O/P EST MOD 30 MIN: CPT | Mod: 95

## 2020-08-24 RX ORDER — METRONIDAZOLE 7.5 MG/G
0.75 GEL VAGINAL
Qty: 1 | Refills: 2 | Status: DISCONTINUED | COMMUNITY
Start: 2019-11-20 | End: 2020-08-24

## 2020-08-24 RX ORDER — HEPARIN SODIUM 5000 [USP'U]/.5ML
5000 INJECTION, SOLUTION INTRAVENOUS; SUBCUTANEOUS TWICE DAILY
Qty: 60 | Refills: 1 | Status: DISCONTINUED | COMMUNITY
Start: 2020-04-28 | End: 2020-08-24

## 2020-08-24 RX ORDER — ENOXAPARIN SODIUM 100 MG/ML
40 INJECTION SUBCUTANEOUS
Qty: 12 | Refills: 0 | Status: ACTIVE | COMMUNITY
Start: 2020-08-12

## 2020-08-24 RX ORDER — HEPARIN SODIUM 5000 [USP'U]/.5ML
5000 INJECTION, SOLUTION INTRAVENOUS; SUBCUTANEOUS
Qty: 50 | Refills: 0 | Status: DISCONTINUED | COMMUNITY
Start: 2020-05-22

## 2020-08-24 RX ORDER — HEPARIN SODIUM 5000 [USP'U]/.5ML
5000 INJECTION, SOLUTION INTRAVENOUS; SUBCUTANEOUS TWICE DAILY
Qty: 60 | Refills: 1 | Status: DISCONTINUED | COMMUNITY
Start: 2020-05-26 | End: 2020-08-24

## 2020-08-24 NOTE — ASSESSMENT
[FreeTextEntry1] : 34yo obese black F  with chronic Hep B (tx paused during pregnancy, no viral load), ALLEN on CPAP with pulmonary hypertension, bronchiectasis, DVT + b/l PE on anticoagulation '1 (follows with heme, mildly elevated Factor VIII, heterozygous MTHFR) s/p Csection 20 at 40 weeks gestation c/b severe PEC (GOLD Cr up to 1.5, no proteinuria, mildly elevated LFT and mild thrombocytopenia, BPs controlled w/o medications) here for f/u via video visit:\par \par # Severe PEC with GOLD\par July labs reviewed - Cr 1.05 egfr 68-79, bicarb 18 PCR 0.3g. Of note, her Cr was noted to be 0.8 Dec/19 (around 3 months gestation). Recheck labs again to confirm renal fx back down to baseline and proteinuria resolves (suspect low grade proteinuria was due to blood contaminant). Also noted in her Hematology note from Dec was use of tenofovir in the past. Her GI dr is placing her on antivirals again for Hep B, she will let me know the name, if Tenofovir would like to have her renal function and urine analysis monitored every 3-4 months. \par \par # Elevated BPs - per pt on recent two dr's visits bps <120s/80s, her baseline BP is low. Stressed importance of picking up her BP monitor at home to check at least 2x/week or if feels unwell to ensure we're not missing masked HTN at least until 3m post partum (through the month of Sept)\par \par f/u in 6 months\par \par

## 2020-09-28 NOTE — HISTORY OF PRESENT ILLNESS
[Delivery Date: ___] : on [unfilled] [Female] : Delivery History: baby girl [Postpartum Follow Up] : postpartum follow up [] : delivered by vaginal delivery [Back to Normal] : is back to normal in size [Mild] : mild vaginal bleeding [Normal] : the vagina was normal [Doing Well] : is doing well [Not Done] : Examination of breasts not done [Excellent Pain Control] : has excellent pain control [No Sign of Infection] : is showing no signs of infection [None] : None [FreeTextEntry8] : Follow up six week post partum exam [FreeTextEntry9] : PEC  [de-identified] :  for arrest of dilation [Complications:___] : no complications [Breastfeeding] : not currently nursing [S/Sx PP Depression] : no signs/symptoms of postpartum depression [Erythema] : not erythematous [Cervix Sample Taken] : cervical sample not taken for a Pap smear

## 2020-12-16 ENCOUNTER — APPOINTMENT (OUTPATIENT)
Dept: OBGYN | Facility: CLINIC | Age: 35
End: 2020-12-16

## 2021-02-25 ENCOUNTER — APPOINTMENT (OUTPATIENT)
Dept: NEPHROLOGY | Facility: CLINIC | Age: 36
End: 2021-02-25

## 2021-05-17 NOTE — PATIENT PROFILE OB - NS PRO TDAP CONTRAINDICATIONS
Concerta CR 36 MG Tablets e-prescribed to preferred pharmacy via [employer].  Prescription Drug Monitoring Program (PDMP) reviewed, and therapy is appropriate at this time. No aberrant behavior identified. Patient should complete a UDS and narcotic agreement at 7/12/21 OV       None

## 2021-10-06 PROBLEM — I10 ESSENTIAL HYPERTENSION: Status: ACTIVE | Noted: 2019-11-19

## 2022-01-07 NOTE — DISCHARGE NOTE OB - AVOID SITTING IN ONE POSITION FOR MORE THAN ONE HOUR
[facility] ([facility])  [phone]  Skilled nursing, home physical therapy visits. RN will call 1-2 days after discharge to schedule visit.   Statement Selected

## 2024-10-24 ENCOUNTER — APPOINTMENT (OUTPATIENT)
Dept: SURGICAL ONCOLOGY | Facility: CLINIC | Age: 39
End: 2024-10-24

## 2024-10-24 ENCOUNTER — RESULT REVIEW (OUTPATIENT)
Age: 39
End: 2024-10-24

## 2024-10-24 PROCEDURE — 99205 OFFICE O/P NEW HI 60 MIN: CPT | Mod: 25

## 2024-10-24 PROCEDURE — 19083 BX BREAST 1ST LESION US IMAG: CPT | Mod: LT

## 2024-10-30 ENCOUNTER — NON-APPOINTMENT (OUTPATIENT)
Age: 39
End: 2024-10-30

## 2024-11-07 ENCOUNTER — APPOINTMENT (OUTPATIENT)
Dept: SURGICAL ONCOLOGY | Facility: CLINIC | Age: 39
End: 2024-11-07

## 2024-11-07 PROCEDURE — 99214 OFFICE O/P EST MOD 30 MIN: CPT | Mod: 25

## 2024-11-07 PROCEDURE — 17250 CHEM CAUT OF GRANLTJ TISSUE: CPT

## 2024-11-21 ENCOUNTER — APPOINTMENT (OUTPATIENT)
Dept: SURGICAL ONCOLOGY | Facility: CLINIC | Age: 39
End: 2024-11-21